# Patient Record
Sex: FEMALE | Race: BLACK OR AFRICAN AMERICAN
[De-identification: names, ages, dates, MRNs, and addresses within clinical notes are randomized per-mention and may not be internally consistent; named-entity substitution may affect disease eponyms.]

---

## 2017-09-18 ENCOUNTER — HOSPITAL ENCOUNTER (OUTPATIENT)
Dept: HOSPITAL 74 - FER | Age: 37
Setting detail: OBSERVATION
LOS: 3 days | Discharge: HOME | End: 2017-09-21
Attending: NURSE PRACTITIONER | Admitting: INTERNAL MEDICINE
Payer: COMMERCIAL

## 2017-09-18 VITALS — BODY MASS INDEX: 43.7 KG/M2

## 2017-09-18 DIAGNOSIS — Z88.2: ICD-10-CM

## 2017-09-18 DIAGNOSIS — Z88.8: ICD-10-CM

## 2017-09-18 DIAGNOSIS — K92.2: Primary | ICD-10-CM

## 2017-09-18 DIAGNOSIS — G40.909: ICD-10-CM

## 2017-09-18 DIAGNOSIS — Z98.891: ICD-10-CM

## 2017-09-18 DIAGNOSIS — I10: ICD-10-CM

## 2017-09-18 DIAGNOSIS — R11.2: ICD-10-CM

## 2017-09-18 DIAGNOSIS — E03.9: ICD-10-CM

## 2017-09-18 DIAGNOSIS — E89.0: ICD-10-CM

## 2017-09-18 DIAGNOSIS — F41.9: ICD-10-CM

## 2017-09-18 DIAGNOSIS — Z87.11: ICD-10-CM

## 2017-09-18 DIAGNOSIS — N12: ICD-10-CM

## 2017-09-18 DIAGNOSIS — Z87.442: ICD-10-CM

## 2017-09-18 LAB
ALBUMIN SERPL-MCNC: 4.1 G/DL (ref 3.5–5)
ALP SERPL-CCNC: 89 U/L (ref 32–92)
ALT SERPL-CCNC: 16 U/L (ref 10–40)
ANION GAP SERPL CALC-SCNC: 12 MMOL/L (ref 8–16)
APPEARANCE UR: (no result)
AST SERPL-CCNC: 22 U/L (ref 10–42)
BACTERIA #/AREA URNS HPF: (no result) /HPF
BASOPHILS # BLD: 2.2 % (ref 0–2)
BILIRUB SERPL-MCNC: 0.2 MG/DL (ref 0.2–1)
CALCIUM SERPL-MCNC: 8.8 MG/DL (ref 8.4–10.2)
CO2 SERPL-SCNC: 19 MMOL/L (ref 22–28)
COLOR UR: (no result)
CREAT SERPL-MCNC: 1 MG/DL (ref 0.6–1.3)
DEPRECATED RDW RBC AUTO: 14.1 % (ref 11.6–15.6)
EOSINOPHIL # BLD: 1.8 % (ref 0–4.5)
GLUCOSE SERPL-MCNC: 100 MG/DL (ref 74–106)
INR BLD: 1.01 (ref 0.82–1.09)
LEUKOCYTE ESTERASE UR QL STRIP: (no result)
MCH RBC QN AUTO: 26.2 PG (ref 25.7–33.7)
MCHC RBC AUTO-ENTMCNC: 32.6 G/DL (ref 32–36)
MCV RBC: 80.2 FL (ref 80–96)
NEUTROPHILS # BLD: 66.3 % (ref 42.8–82.8)
PH UR: 7 [PH] (ref 4.5–8)
PLATELET # BLD AUTO: 305 K/MM3 (ref 134–434)
PMV BLD: 9 FL (ref 7.5–11.1)
PROT SERPL-MCNC: 7.4 G/DL (ref 6.4–8.3)
PROT UR QL STRIP: (no result)
PT PNL PPP: 11.3 SEC (ref 10.2–13)
RBC # BLD AUTO: >100 /HPF (ref 0–3)
RBC # UR STRIP: (no result) /UL
SP GR UR: 1.02 (ref 1–1.02)
UROBILINOGEN UR STRIP-MCNC: 0.2 MG/DL (ref 0.2–1)
WBC # BLD AUTO: 11.6 K/MM3 (ref 4–10.8)

## 2017-09-18 PROCEDURE — G0378 HOSPITAL OBSERVATION PER HR: HCPCS

## 2017-09-18 NOTE — PDOC
History of Present Illness





- General


History Source: Patient


Exam Limitations: No Limitations





- History of Present Illness


Initial Comments: 


09/18/17 22:16


Patient is a 37 year old female with a significant past medical history of 

Abdominal surgeries, Seizures, Peptic Ulcers, Anxiety, kidney stones who 

presents to the ED with complaints of left flank pain beginning 6 days before 

ED arrival.  Patient reports left flank pain began suddenly 6 days ago that has 

increased in intensity since then. She reports abdominal pain beginning 5 days 

ago.  Patient reports hematuria and hematemesis secondary to left flank pain 

and abdominal pain.  She reports taking Naprosyn x2 with slight relief every 

day for 5 days (1000 mg per day).  Patient reports chronic episodes of nausea 

beginning 4 days ago secondary to flank pain.  She reports not being able to 

hold any medication down due to vomiting. 





Denies chest pain, SOB. Denies diarrhea, constipation. Denies dizziness, 

lightheadedness. Denies fever, chills. Denies any dysuria, hematochezia. Denies 

any other symptoms. 


Allergies: Torodal (seizures), Sulfa, Ketorolac tromethamine 


Social history 


Surgical history: Multiple abdominal hernia surgeries. Gallbladder removal. 

Appendix removal. Thyroid removal. Parathyroid removal. 


PMD: Dr. Aquilino Leo








09/18/17 22:20








09/18/17 22:21








<Julius Kessler - Last Filed: 09/18/17 22:23>





<Magnolia Boone - Last Filed: 09/21/17 06:08>





- General


Chief Complaint: Pain


Stated Complaint: ABD, LEFT FLANK PAIN


Time Seen by Provider: 09/18/17 21:12





Past History





<Julius Kessler - Last Filed: 09/18/17 22:23>





- Past Medical History


Cancer: Yes (H/O CAPILLARY CANCER)


HTN: Yes


Kidney Stones: Yes


Psychiatric Problems: Yes (ANXIETY)


Seizures: Yes


Thyroid Disease: Yes





- Surgical History


Abdominal Surgery: Yes (HERNIA REPAIRS)





- Suicide/Smoking/Psychosocial Hx


Smoking History: Never smoked


Substance Use Type: None





<Magnolia Boone - Last Filed: 09/21/17 06:08>





- Past Medical History


Allergies/Adverse Reactions: 


 Allergies











Allergy/AdvReac Type Severity Reaction Status Date / Time


 


ketorolac tromethamine Allergy   Verified 09/18/17 20:55





[From Toradol]     


 


Sulfa (Sulfonamide Allergy   Verified 09/18/17 20:58





Antibiotics)     


 


tramadol Allergy   Verified 09/18/17 20:55











Home Medications: 


Ambulatory Orders





Atenolol [Tenormin -] 50 mg PO BID 09/18/17 


Levetiracetam [Keppra -] 750 mg PO BID 09/18/17 


Levothyroxine Sodium [Synthroid] 275 mcg PO DAILY 09/18/17 


Liothyronine Sodium [Cytomel] 5 mcg PO DAILY 09/18/17 


Prazosin HCl [Minipress] 5 mg PO HS 09/18/17 


Quetiapine Fumarate [Seroquel] 300 mg PO HS 09/18/17 


Tizanidine HCl [Zanaflex] 4 mg PO ASDIR 09/18/17 


Zolpidem Tartrate [Ambien] 10 mg PO ASDIR PRN 09/18/17 











**Review of Systems





- Review of Systems


Able to Perform ROS?: Yes


Comments:: 


09/18/17 22:16


GENERAL/CONSTITUTIONAL: No fever or chills. No weakness.


HEAD, EYES, EARS, NOSE AND THROAT: No change in vision. No ear pain or 

discharge. No sore throat.


GASTROINTESTINAL: No nausea, vomiting, diarrhea or constipation.


GENITOURINARY: No dysuria, frequency, or change in urination.


CARDIOVASCULAR: No chest pain or shortness of breath.


RESPIRATORY: No cough, wheezing, or hemoptysis.


MUSCULOSKELETAL: +Left flank pain. + Epigastric pain. 


No joint or muscle swelling or pain. No neck or back pain.


SKIN: No rash


NEUROLOGIC: No headache, vertigo, loss of consciousness, or change in strength/

sensation.


ENDOCRINE: No increased thirst. No abnormal weight change.


HEMATOLOGIC/LYMPHATIC: No anemia, easy bleeding, or history of blood clots.


ALLERGIC/IMMUNOLOGIC: No hives or skin allergy.








09/18/17 22:23





All Other Systems: Reviewed and Negative





<Julius Kessler - Last Filed: 09/18/17 22:23>





*Physical Exam





- Vital Signs


 Last Vital Signs











Temp Pulse Resp BP Pulse Ox


 


 98.7 F   113 H  20   147/106   98 


 


 09/18/17 20:55  09/18/17 20:55  09/18/17 20:55  09/18/17 20:55  09/18/17 20:55














- Physical Exam


Comments: 


09/18/17 22:17


GENERAL: Awake, alert, and fully oriented, Moderate distress from flank pain 

and epigastric pain


HEAD: No signs of trauma


EYES: PERRLA, EOMI, sclera anicteric, conjunctiva clear


ENT: Auricles normal inspection, hearing grossly normal, nares patent, 

oropharynx clear without exudates. Moist mucosa


NECK: Normal ROM, supple, no lymphadenopathy, JVD, or masses


LUNGS: Breath sounds equal, clear to auscultation bilaterally.  No wheezes, and 

no crackles


HEART: Regular rate and rhythm, normal S1 and S2, no murmurs, rubs or gallops


ABDOMEN: +CVA tenderness. +Epigastric tenderness. +Left upper quadrant left 

flank tenderness. + Multiple old abdominal wall healed incision. NO peritoneal 

signs or masses. 


Soft, normoactive bowel sounds.  No guarding, no rebound.


EXTREMITIES: Normal range of motion, no edema.  No clubbing or cyanosis. No 

cords, erythema, or tenderness


NEUROLOGICAL: Cranial nerves II through XII grossly intact.  Normal speech, 

normal gait


SKIN: Warm, Dry, normal turgor, no rashes or lesions noted.








09/18/17 22:23








<Julius Kessler - Last Filed: 09/18/17 22:23>





- Vital Signs


 Last Vital Signs











Temp Pulse Resp BP Pulse Ox


 


 98.7 F   113 H  20   147/106   98 


 


 09/18/17 20:55  09/18/17 20:55  09/18/17 20:55  09/18/17 20:55  09/18/17 20:55














- Physical Exam


Comments: 





12-lead electrocardiogram showed normal sinus rhythm 87 bpm; axis, wave forms 

and intervals were all normal.  There is no acute ST or T-wave abnormalities.








<Magnolia Boone - Last Filed: 09/21/17 06:08>





ED Treatment Course





- LABORATORY


CBC & Chemistry Diagram: 


 09/18/17 21:25





 09/18/17 21:25





- ADDITIONAL ORDERS


Additional order review: 


 Laboratory  Results











  09/18/17 09/18/17 09/18/17





  21:25 21:25 21:10


 


PT with INR  11.3  


 


INR  1.01  


 


Sodium   138 


 


Potassium   3.9 


 


Chloride   107 


 


Carbon Dioxide   19 L 


 


Anion Gap   12 


 


BUN   8 


 


Creatinine   1.0 


 


Creat Clearance w eGFR   > 60 


 


Random Glucose   100 


 


Calcium   8.8 


 


Total Bilirubin   0.2 


 


AST   22 


 


ALT   16 


 


Alkaline Phosphatase   89 


 


Total Protein   7.4 


 


Albumin   4.1 


 


Urine Color    Pink


 


Urine Appearance    Sl cloudy


 


Urine pH    7.0


 


Ur Specific Gravity    1.020


 


Urine Protein    1+ H


 


Urine Glucose (UA)    Negative


 


Urine Ketones    Negative


 


Urine Blood    3+ H


 


Urine Nitrite    Negative


 


Urine Bilirubin    Negative


 


Urine Urobilinogen    0.2


 


Urine RBC    >100


 


Urine WBC    10-20


 


Ur Epithelial Cells    3+


 


Urine Bacteria    1+


 


Urine HCG, Qual    Negative








 











  09/18/17





  21:25


 


RBC  5.05


 


MCV  80.2


 


MCHC  32.6


 


RDW  14.1


 


MPV  9.0


 


Neutrophils %  66.3


 


Lymphocytes %  24.4


 


Monocytes %  5.3


 


Eosinophils %  1.8


 


Basophils %  2.2 H














- Medications


Given in the ED: 


ED Medications














Discontinued Medications














Generic Name Dose Route Start Last Admin





  Trade Name Carlos Enrique  PRN Reason Stop Dose Admin


 


Morphine Sulfate  4 mg 09/18/17 22:04 09/18/17 22:14





  Morphine Injection -  IVPUSH 09/18/17 22:05  4 mg





  ONCE ONE   Administration


 


Ondansetron HCl  4 mg 09/18/17 22:08 09/18/17 22:14





  Zofran Injection  IVPUSH 09/18/17 22:09  4 mg





  ONCE ONE   Administration














<Julius Kessler - Last Filed: 09/18/17 22:23>





- LABORATORY


CBC & Chemistry Diagram: 


 09/20/17 08:25





 09/20/17 08:25





- ADDITIONAL ORDERS


Additional order review: 


 Laboratory  Results











  09/18/17





  21:10


 


Urine Color  Pink


 


Urine Appearance  Sl cloudy


 


Urine pH  7.0


 


Ur Specific Gravity  1.020


 


Urine Protein  1+ H


 


Urine Glucose (UA)  Negative


 


Urine Ketones  Negative


 


Urine Blood  3+ H


 


Urine Nitrite  Negative


 


Urine Bilirubin  Negative


 


Urine Urobilinogen  0.2














<Magnolia Boone - Last Filed: 09/21/17 06:08>





Progress Note





- Progress Note


Progress Note: 


Documentation has been prepared under my direction and personally reviewed by 

me in its entirety. I attest that this documented accurately reflects all work, 

treatment, procedures and medical decision making performed by me.





<Magnolia Boone - Last Filed: 09/21/17 06:08>





Medical Decision Making





- Medical Decision Making


As noted above, this 37-year-old woman with a history of kidney stones (

requiring stent placement), peptic ulcer disease, seizures presents with 

several day history of left flank/CVA pain for which she took 1 g of naproxen 

daily.  Within of a few days of this, she developed epigastric pain and nausea.

  She has been vomiting today with blood in her vomitus.  No history of fever/

chills.  She has had no melena or blood per rectum.  Patient states that she 

knew that she was not supposed to take nonsteroidal anti-inflammatories in 

light of her peptic ulcer disease, but flank pain was severe.  She has not had 

dysuria/urinary frequency or urgency but has noted blood in her urine for the 

last few days.


Exam as noted.


Patient had been given 4 mg of Zofran/4 mg of morphine IV for her pain and 

nausea/vomiting.  Protonix 40 mg IV was given for her upper GI bleeding.


Laboratory evaluation notable for a white blood cell count of 11,600.  Of note, 

hemoglobin and hematocrit are normal at 13.2/40.5.  BUN and creatinine are 

normal at 8 and 1.  Remainder of the chemistry profile reveals no abnormalities 

of electrolytes or liver function tests.  Urinalysis shows greater than 100 RBCs

/1020 WBCs, 3+ epi, 1+ bacteria on microscopic exam.





Renal stone protocol CT showed no evidence of kidney stones/hydronephrosis or 

other abnormalities of the urinary tract.  Moreover, there is no other acute 

pathology such as acute diverticulitis or bowel obstruction.





Patient given another dose of Zofran 4 mg IV and Rocephin 1 g IV for presumed 

urinary tract infection.





Patient continued to vomit and have severe pain.  There was bright red blood in 

the vomitus.  Because of patient's intractable nausea/vomiting and persistent 

pain, she will need admission for evaluation/treatment of her upper GI bleed 

and pain control.














<Magnolia Boone - Last Filed: 09/21/17 06:08>





*DC/Admit/Observation/Transfer





- Attestations


Scribe Attestion: 





09/18/17 22:17





Documentation prepared by Julius Kessler, acting as medical scribe for Magnolia Boone MD/DO.





<Julius Kessler - Last Filed: 09/18/17 22:23>





- Discharge Dispostion


Admit: Yes





<Magnolia Boone - Last Filed: 09/21/17 06:08>


Diagnosis at time of Disposition: 


Intractable vomiting with nausea


Qualifiers:


 Vomiting type: unspecified Qualified Code(s): R11.2 - Nausea with vomiting, 

unspecified





- Discharge Dispostion


Condition at time of disposition: Stable

## 2017-09-19 LAB
ANION GAP SERPL CALC-SCNC: 8 MMOL/L (ref 8–16)
APTT BLD: 27.6 SECONDS (ref 24–38.9)
CALCIUM SERPL-MCNC: 8.4 MG/DL (ref 8.4–10.2)
CO2 SERPL-SCNC: 22 MMOL/L (ref 22–28)
CREAT SERPL-MCNC: 0.8 MG/DL (ref 0.6–1.3)
DEPRECATED RDW RBC AUTO: 14.1 % (ref 11.6–15.6)
GLUCOSE SERPL-MCNC: 91 MG/DL (ref 74–106)
INR BLD: 1.07 (ref 0.82–1.09)
MAGNESIUM SERPL-MCNC: 2 MG/DL (ref 1.8–2.4)
MCH RBC QN AUTO: 26 PG (ref 25.7–33.7)
MCHC RBC AUTO-ENTMCNC: 31.9 G/DL (ref 32–36)
MCV RBC: 81.5 FL (ref 80–96)
PLATELET # BLD AUTO: 284 K/MM3 (ref 134–434)
PMV BLD: 8.9 FL (ref 7.5–11.1)
PT PNL PPP: 12 SEC (ref 10.2–13)
URINE MARIJUANA THC: POSITIVE NG/ML
WBC # BLD AUTO: 12.7 K/MM3 (ref 4–10.8)

## 2017-09-19 PROCEDURE — 3E03329 INTRODUCTION OF OTHER ANTI-INFECTIVE INTO PERIPHERAL VEIN, PERCUTANEOUS APPROACH: ICD-10-PCS | Performed by: NURSE PRACTITIONER

## 2017-09-19 PROCEDURE — 3E0337Z INTRODUCTION OF ELECTROLYTIC AND WATER BALANCE SUBSTANCE INTO PERIPHERAL VEIN, PERCUTANEOUS APPROACH: ICD-10-PCS | Performed by: NURSE PRACTITIONER

## 2017-09-19 PROCEDURE — 3E0333Z INTRODUCTION OF ANTI-INFLAMMATORY INTO PERIPHERAL VEIN, PERCUTANEOUS APPROACH: ICD-10-PCS | Performed by: NURSE PRACTITIONER

## 2017-09-19 PROCEDURE — 3E033NZ INTRODUCTION OF ANALGESICS, HYPNOTICS, SEDATIVES INTO PERIPHERAL VEIN, PERCUTANEOUS APPROACH: ICD-10-PCS | Performed by: INTERNAL MEDICINE

## 2017-09-19 PROCEDURE — 3E033GC INTRODUCTION OF OTHER THERAPEUTIC SUBSTANCE INTO PERIPHERAL VEIN, PERCUTANEOUS APPROACH: ICD-10-PCS | Performed by: NURSE PRACTITIONER

## 2017-09-19 RX ADMIN — HYDROMORPHONE HYDROCHLORIDE PRN MG: 1 INJECTION, SOLUTION INTRAMUSCULAR; INTRAVENOUS; SUBCUTANEOUS at 15:28

## 2017-09-19 RX ADMIN — PRAZOSIN HYDROCHLORIDE SCH MG: 5 CAPSULE ORAL at 21:17

## 2017-09-19 RX ADMIN — QUETIAPINE FUMARATE SCH MG: 100 TABLET ORAL at 21:17

## 2017-09-19 RX ADMIN — ATENOLOL SCH MG: 50 TABLET ORAL at 21:17

## 2017-09-19 RX ADMIN — CEFTRIAXONE SODIUM SCH MLS/HR: 2 INJECTION, POWDER, FOR SOLUTION INTRAMUSCULAR; INTRAVENOUS at 09:28

## 2017-09-19 RX ADMIN — ONDANSETRON PRN MG: 2 INJECTION INTRAMUSCULAR; INTRAVENOUS at 09:25

## 2017-09-19 RX ADMIN — ATENOLOL SCH MG: 50 TABLET ORAL at 09:27

## 2017-09-19 RX ADMIN — POTASSIUM CHLORIDE AND SODIUM CHLORIDE SCH MLS/HR: 900; 150 INJECTION, SOLUTION INTRAVENOUS at 10:15

## 2017-09-19 RX ADMIN — LEVETIRACETAM SCH MG: 250 TABLET, FILM COATED ORAL at 21:16

## 2017-09-19 RX ADMIN — LIOTHYRONINE SODIUM SCH MCG: 5 TABLET ORAL at 10:03

## 2017-09-19 RX ADMIN — PANTOPRAZOLE SODIUM SCH MLS/HR: 40 INJECTION, POWDER, FOR SOLUTION INTRAVENOUS at 08:30

## 2017-09-19 RX ADMIN — HYDROMORPHONE HYDROCHLORIDE PRN MG: 1 INJECTION, SOLUTION INTRAMUSCULAR; INTRAVENOUS; SUBCUTANEOUS at 22:21

## 2017-09-19 NOTE — CONS
GASTROENTEROLOGY CONSULTATION

 

DATE OF CONSULTATION:  09/19/2017

 

HISTORY OF PRESENT ILLNESS:  The patient is a 37-year-old female with a past medical

history of hypertension, hypothyroidism status post thyroid cancer with surgery,

peptic ulcer disease, and renal calculi.  She admits to having left flank pain over

the past couple of days and noticed some blood in her urine with worsening pain,

prompting her to come to the emergency room.  She also had an episode of hematemesis.

 She reports taking Naprosyn twice daily for her pain for the past 5 days.  She has

not had a recent endoscopy.  She has not had further episodes of hematemesis while

she has been hospitalized.  She denies any melena.  She does admit to epigastric

abdominal pain.  No hematochezia.  No weight loss.

 

PAST MEDICAL HISTORY:  Hypertension, anxiety, hypothyroidism, thyroid cancer, peptic

ulcer disease, seizure disorder, and kidney stones.

 

SURGICAL HISTORY:  Thyroidectomy, parathyroidectomy, cholecystectomy, appendectomy,

multiple umbilical hernia repairs.

 

SOCIAL HISTORY:  Recently graduated from [employer].  Denies smoking and drug

use.  Drinks socially.

 

FAMILY HISTORY:  No history of GI or gynecological malignancies.

 

HOME MEDICATIONS:  Reviewed.

 

REVIEW OF SYSTEMS:  Negative except for the pertinent positives listed in the HPI.

 

LABORATORY DATA:  White blood cell count 12.7, hemoglobin 12.5 and hematocrit 39, MCV

81, platelet count 284.  INR 1.  Sodium 136, potassium 3.6, chloride 106, BUN 8,

creatinine 0.8, calcium 8.4, bilirubin 0.2, AST 22, ALT 16, alkaline phosphatase 89. 

Total amylase 42, lipase less than 20.  Urine with protein and blood.

 

CT scan of the abdomen and pelvis was performed on this admission and reveals no

evidence of urolithiasis or obstructive uropathy.  No definitive acute pathology

identified.  Status post cholecystectomy.

 

Urine culture is pending.

 

IMPRESSION:  Epigastric abdominal pain, episode of hematemesis while on non-steroidal

anti-inflammatory drug therapy.  Differential diagnosis includes peptic ulcer

disease, angioectasias, esophagitis.  There is no sign of an overt gastrointestinal

bleed at this time.

 

RECOMMENDATION:  N.p.o., IV fluids, Protonix infusion.  Avoid NSAID.  We will plan

for diagnostic upper endoscopy today.  Risks including, but not limited to, bleeding,

infection, anesthesia, risks of a tear in the bowel were discussed with the patient

in detail.

 

 

DO JOVITA MARIEE/[id number]

DD: 09/19/2017 13:11

DT: 09/19/2017 16:13

Job #:  [id number]

## 2017-09-19 NOTE — HP
CHIEF COMPLAINT:  abdominal pain 





PCP: Dr Leo





HISTORY OF PRESENT ILLNESS:  Patient is a 36 y/o female with a past medical 

history of hypertension, hypothyroidism (thyroid CA), peptic ulcer disease, and 

kidney stones.  Patient reports left flank pain for the past 6 days.  She 

reports noticing blood in her urine yesterday evening and worsening flank pain.

  Patient also reports worsening of flank pain within the past 24 hours with 

hematuria and hemataemesis.  She reports taking naproxyn BID for her flank pain 

daily for the past 5 days.  Patient denies any fever.  





ER course was notable for:


(1) ct scan of abd/pelvis (renal stone) no obstructive uropathy, no 

nephrolithasis, no acute pathology


(2) urinalysis + 3 blood + 1 bacteria 


(3) h/h 12/39





Recent Travel: none





PAST MEDICAL HISTORY:  hypertension, anxiety, hypothyroidism, thyroid CA, 

peptic ulcer disease, seizure disorderand kidney stones





PAST SURGICAL HISTORY:  thyroidectomy, parathyroidectomy, cholecystecomy, 

appendectomy, multiple umbilical hernia repairs





Social History:  recently graduated from [employer] school 


Smoking: none


Alcohol:social 


Drugs: none





Family History:  non contributory to this admission 


Allergies





ketorolac tromethamine [From Toradol] Allergy (Verified 09/18/17 20:55)


 


Sulfa (Sulfonamide Antibiotics) Allergy (Verified 09/18/17 20:58)


 


tramadol Allergy (Verified 09/18/17 20:55)


 








HOME MEDICATIONS:


 Home Medications











 Medication  Instructions  Recorded


 


Atenolol [Tenormin -] 50 mg PO BID 09/18/17


 


Levetiracetam [Keppra -] 750 mg PO BID 09/18/17


 


Levothyroxine Sodium [Synthroid] 275 mcg PO DAILY 09/18/17


 


Liothyronine Sodium [Cytomel] 5 mcg PO DAILY 09/18/17


 


Lorazepam [Ativan] 2 mg PO DAILY 09/18/17


 


Prazosin HCl [Minipress] 4 mg PO ASDIR 09/18/17


 


Quetiapine Fumarate [Seroquel] 300 mg PO HS 09/18/17


 


Tizanidine HCl [Zanaflex] 4 mg PO ASDIR 09/18/17


 


Zolpidem Tartrate [Ambien] 10 mg PO ASDIR PRN 09/18/17








REVIEW OF SYSTEMS


CONSTITUTIONAL: 


Absent:  fever, chills, diaphoresis, generalized weakness, malaise, loss of 

appetite, weight change


HEENT: 


Absent:  rhinorrhea, nasal congestion, throat pain, throat swelling, difficulty 

swallowing, mouth swelling, ear pain, eye pain, visual changes


CARDIOVASCULAR: 


Absent: chest pain, syncope, palpitations, irregular heart rate, lightheadedness

, peripheral edema


RESPIRATORY: 


Absent: cough, shortness of breath, dyspnea with exertion, orthopnea, wheezing, 

stridor, hemoptysis


GASTROINTESTINAL:


Present: nausea, vomiting, hematoemesis 


Absent: abdominal pain, abdominal distension, , diarrhea, constipation, melena, 

hematochezia


GENITOURINARY: 


present: flank pain, hematuria 


Absent: dysuria, frequency, urgency, hesitancy, hematuria, genital pain


MUSCULOSKELETAL: 


Absent: myalgia, arthralgia, joint swelling, back pain, neck pain


SKIN: 


Absent: rash, itching, pallor


HEMATOLOGIC/IMMUNOLOGIC: 


Absent: easy bleeding, easy bruising, lymphadenopathy, frequent infections


ENDOCRINE:


Absent: unexplained weight gain, unexplained weight loss, heat intolerance, 

cold intolerance


NEUROLOGIC: 


Absent: headache, focal weakness or paresthesias, dizziness, unsteady gait, 

seizure, mental status changes, bladder or bowel incontinence


PSYCHIATRIC: 


Absent: anxiety, depression, suicidal or homicidal ideation, hallucinations.








PHYSICAL EXAMINATION


 Vital Signs - 24 hr











  09/19/17 09/19/17





  02:29 03:03


 


Temperature 98.9 F 98.9 F


 


Pulse Rate 93 H 93 H


 


Respiratory 19 18





Rate  


 


Blood Pressure 148/94 148/94


 


O2 Sat by Pulse  98





Oximetry (%)  











GENERAL: Awake, alert, and fully oriented, in no acute distress.


HEAD: Normal with no signs of trauma.


EYES: Pupils equal, round and reactive to light, extraocular movements intact, 

sclera anicteric, conjunctiva clear. No lid lag.


EARS, NOSE, THROAT: Ears normal, nares patent, oropharynx clear without 

exudates. Moist mucous membranes.


NECK: Normal range of motion, supple without lymphadenopathy, JVD, or masses.


LUNGS: Breath sounds equal, clear to auscultation bilaterally. No wheezes, and 

no crackles. No accessory muscle use.


HEART: Regular rate and rhythm, normal S1 and S2 without murmur, rub or gallop.


ABDOMEN: Soft,obese, well healed surgical scars, + suprapubic tenderness, + 

epigastric tenderness, normoactive bowel sounds, no guarding, no rebound, no 

masses.  No hepatomegaly or  splenomegaly. 


MUSCULOSKELETAL: Normal range of motion at all joints. No bony deformities or 

tenderness. + right CVA tenderness.


UPPER EXTREMITIES: 2+ pulses, warm, well-perfused. No cyanosis. No clubbing. No 

peripheral edema.


LOWER EXTREMITIES: 2+ pulses, warm, well-perfused. No calf tenderness. No 

peripheral edema. 


NEUROLOGICAL:  Cranial nerves II-XII intact. Normal speech. Normal gait.


PSYCHIATRIC: Cooperative. Good eye contact. Appropriate mood and affect.


SKIN: Warm, dry, normal turgor, no rashes or lesions noted, normal capillary 

refill. 





ASSESSMENT/PLAN:








F/E/N


- npo -->ivf


- replete lytes prn





ppx


- hold ac due to active bleed


- protonix gtt


- scd


- oob





dispo: requires obsv admission 





Problem List





- Problem


(1) Upper GI bleeding


Assessment/Plan: 


- likely secondary to PUD, pt reports taking naproxyn daily, start protonix gtt


- npo for emergent EGD


- case discussed with Dr Madden, GI will evaluate patient today


- avoid nsaids


Code(s): K92.2 - GASTROINTESTINAL HEMORRHAGE, UNSPECIFIED





(2) Hypertension


Assessment/Plan: 


- continue atenolol and minipress,  b/p at goal


- strict b/p monitoring 





Code(s): I10 - ESSENTIAL (PRIMARY) HYPERTENSION   





(3) Hypothyroidism


Assessment/Plan: 


- s/p thryoidectomy and parathyoidectomy, continue synthroid (iv dose) and 

cytomel


- pending tsh 





Code(s): E03.9 - HYPOTHYROIDISM, UNSPECIFIED   





(4) Pyelonephritis


Assessment/Plan: 


-ct of abd/pelvis reviewed, no hydronephrois, no obstructive uropathy noted


- rocephin 2gm IV daily, pending urine culture 


- monitor wbc and fever curve 


Code(s): N12 - TUBULO-INTERSTITIAL NEPHRITIS, NOT SPCF AS ACUTE OR CHRONIC





(5) Anxiety


Assessment/Plan: 


- continue clonazepam 0.5mg daily prn dosage verified with [facility] pharmacy (

[place]) and  reference # [id number] and seroquel (home dose) 


Code(s): F41.9 - ANXIETY DISORDER, UNSPECIFIED








Visit type





- Emergency Visit


Emergency Visit: Yes


ED Registration Date: 09/19/17


Care time: The patient presented to the Emergency Department on the above date 

and was hospitalized for further evaluation of their emergent condition.





- New Patient


This patient is new to me today: Yes


Date on this admission: 09/19/17





- Critical Care


Critical Care patient: No

## 2017-09-19 NOTE — EKG
Test Reason : 

Blood Pressure : ***/*** mmHG

Vent. Rate : 086 BPM     Atrial Rate : 086 BPM

   P-R Int : 152 ms          QRS Dur : 086 ms

    QT Int : 372 ms       P-R-T Axes : 034 024 006 degrees

   QTc Int : 445 ms

 

NORMAL SINUS RHYTHM

NONSPECIFIC T WAVE ABNORMALITY

ABNORMAL ECG

NO PREVIOUS ECGS AVAILABLE

NO CLINICAL INFORMATION IS AVAILABLE

REPEAT EKG IF CLINICALLY INDICATED

Confirmed by KEYLA RAMOS MD ([id number]) on 9/19/2017 10:07:44 PM

 

Referred By: MD ENGEL           Confirmed By:KEYLA RAMOS MD

## 2017-09-20 LAB
ANION GAP SERPL CALC-SCNC: 1 MMOL/L (ref 8–16)
BASOPHILS # BLD: 3.5 % (ref 0–2)
CALCIUM SERPL-MCNC: 8.1 MG/DL (ref 8.4–10.2)
CO2 SERPL-SCNC: 21 MMOL/L (ref 22–28)
CREAT SERPL-MCNC: 0.9 MG/DL (ref 0.6–1.3)
DEPRECATED RDW RBC AUTO: 14 % (ref 11.6–15.6)
EOSINOPHIL # BLD: 6.7 % (ref 0–4.5)
GLUCOSE SERPL-MCNC: 84 MG/DL (ref 74–106)
MAGNESIUM SERPL-MCNC: 2 MG/DL (ref 1.8–2.4)
MCH RBC QN AUTO: 26.5 PG (ref 25.7–33.7)
MCHC RBC AUTO-ENTMCNC: 33 G/DL (ref 32–36)
MCV RBC: 80.3 FL (ref 80–96)
NEUTROPHILS # BLD: 49.3 % (ref 42.8–82.8)
PHOSPHATE SERPL-MCNC: 2.9 MG/DL (ref 2.5–4.6)
PLATELET # BLD AUTO: 269 K/MM3 (ref 134–434)
PMV BLD: 8.5 FL (ref 7.5–11.1)
T4 SERPL-MCNC: 6.9 UG/DL (ref 4.8–13.9)
TSH SERPL-ACNC: 23.6 UIU/ML (ref 0.36–3.74)
WBC # BLD AUTO: 7.9 K/MM3 (ref 4–10.8)

## 2017-09-20 RX ADMIN — POTASSIUM CHLORIDE AND SODIUM CHLORIDE SCH MLS/HR: 900; 150 INJECTION, SOLUTION INTRAVENOUS at 10:21

## 2017-09-20 RX ADMIN — CEFTRIAXONE SODIUM SCH MLS/HR: 2 INJECTION, POWDER, FOR SOLUTION INTRAMUSCULAR; INTRAVENOUS at 10:22

## 2017-09-20 RX ADMIN — PANTOPRAZOLE SODIUM SCH MLS/HR: 40 INJECTION, POWDER, FOR SOLUTION INTRAVENOUS at 14:30

## 2017-09-20 RX ADMIN — HYDROMORPHONE HYDROCHLORIDE PRN MG: 1 INJECTION, SOLUTION INTRAMUSCULAR; INTRAVENOUS; SUBCUTANEOUS at 05:55

## 2017-09-20 RX ADMIN — MORPHINE SULFATE PRN MG: 4 INJECTION, SOLUTION INTRAMUSCULAR; INTRAVENOUS at 11:41

## 2017-09-20 RX ADMIN — LEVOTHYROXINE SODIUM SCH MCG: 125 TABLET ORAL at 06:11

## 2017-09-20 RX ADMIN — ONDANSETRON PRN MG: 2 INJECTION INTRAMUSCULAR; INTRAVENOUS at 07:36

## 2017-09-20 RX ADMIN — ATENOLOL SCH MG: 50 TABLET ORAL at 10:21

## 2017-09-20 RX ADMIN — MORPHINE SULFATE PRN MG: 4 INJECTION, SOLUTION INTRAMUSCULAR; INTRAVENOUS at 16:16

## 2017-09-20 RX ADMIN — PANTOPRAZOLE SODIUM SCH MLS/HR: 40 INJECTION, POWDER, FOR SOLUTION INTRAVENOUS at 04:29

## 2017-09-20 RX ADMIN — LIOTHYRONINE SODIUM SCH MCG: 5 TABLET ORAL at 10:20

## 2017-09-20 RX ADMIN — QUETIAPINE FUMARATE SCH MG: 100 TABLET ORAL at 21:48

## 2017-09-20 RX ADMIN — PRAZOSIN HYDROCHLORIDE SCH MG: 5 CAPSULE ORAL at 21:49

## 2017-09-20 RX ADMIN — ATENOLOL SCH MG: 50 TABLET ORAL at 21:49

## 2017-09-20 RX ADMIN — LEVETIRACETAM SCH MG: 250 TABLET, FILM COATED ORAL at 21:48

## 2017-09-20 RX ADMIN — LEVETIRACETAM SCH MG: 250 TABLET, FILM COATED ORAL at 10:20

## 2017-09-20 RX ADMIN — MORPHINE SULFATE PRN MG: 4 INJECTION, SOLUTION INTRAMUSCULAR; INTRAVENOUS at 22:21

## 2017-09-20 NOTE — PN
Physical Exam: 


SUBJECTIVE: Patient seen and examined, reports flank pain is much improved, 

does report ongoing nausea and one episode of vomiting this AM 





OBJECTIVE:Patient is a 38 y/o female with a past medical history of hypertension

, hypothyroidism (thyroid CA), peptic ulcer disease, and kidney stones. Patient 

was admitted from the emergency department for a upper GI Bleed and 

pyleonephritis. 





 Vital Signs











 Period  Temp  Pulse  Resp  BP Sys/Valle  Pulse Ox


 


 Last 24 Hr  97.6 F-98.7 F  68-73  16-20  100-119/66-73  97-98











GENERAL: obese, The patient is awake, alert, and fully oriented, in no acute 

distress.


HEAD: Normal with no signs of trauma.


EYES: PERRL, extraocular movements intact, sclera anicteric, conjunctiva clear. 

No ptosis. 


ENT: Ears normal, nares patent, oropharynx clear without exudates, moist mucous 


membranes.


NECK: Trachea midline, full range of motion, supple. 


LUNGS: Breath sounds equal, clear to auscultation bilaterally, no wheezes, no 

crackles, no 


accessory muscle use. 


HEART: Regular rate and rhythm, S1, S2 without murmur, rub or gallop.


ABDOMEN: Soft, epigastric tenderness, well healed surgical scars,  nondistended

, normoactive bowel sounds, no guarding, no 


rebound, no hepatosplenomegaly, no masses, right cva tenderness. 


EXTREMITIES: 2+ pulses, warm, well-perfused, no edema. 


NEUROLOGICAL: Cranial nerves II through XII grossly intact. Normal speech, gait 

not 


observed.


PSYCH: Normal mood, normal affect.


SKIN: Warm, dry, normal turgor, no rashes or lesions noted














 Laboratory Results - last 24 hr











  09/19/17 09/19/17 09/20/17





  07:50 16:00 08:25


 


WBC    7.9  D


 


RBC    4.59


 


Hgb    12.1


 


Hct    36.8


 


MCV    80.3


 


MCH    26.5


 


MCHC    33.0


 


RDW    14.0


 


Plt Count    269


 


MPV    8.5


 


Neutrophils %    49.3  D


 


Lymphocytes %    34.7  D


 


Monocytes %    5.8


 


Eosinophils %    6.7 H D


 


Basophils %    3.5 H


 


Sodium   


 


Potassium   


 


Chloride   


 


Carbon Dioxide   


 


Anion Gap   


 


BUN   


 


Creatinine   


 


Random Glucose   


 


Calcium   


 


Phosphorus  3.1  


 


Magnesium   


 


Opiates Screen   Positive 


 


Methadone Screen   Negative 


 


Barbiturate Screen   Negative 


 


Phencyclidine Screen   Negative 


 


Ur Amphetamines Screen   Negative 


 


MDMA (Ecstasy) Screen   Negative 


 


Benzodiazepines Screen   Negative 


 


Cocaine Screen   Negative 


 


U Marijuana (THC) Screen   Positive 














  09/20/17





  08:25


 


WBC 


 


RBC 


 


Hgb 


 


Hct 


 


MCV 


 


MCH 


 


MCHC 


 


RDW 


 


Plt Count 


 


MPV 


 


Neutrophils % 


 


Lymphocytes % 


 


Monocytes % 


 


Eosinophils % 


 


Basophils % 


 


Sodium  135 L


 


Potassium  4.0


 


Chloride  113 H


 


Carbon Dioxide  21 L


 


Anion Gap  1 L


 


BUN  7


 


Creatinine  0.9


 


Random Glucose  84


 


Calcium  8.1 L


 


Phosphorus  2.9


 


Magnesium  2.0


 


Opiates Screen 


 


Methadone Screen 


 


Barbiturate Screen 


 


Phencyclidine Screen 


 


Ur Amphetamines Screen 


 


MDMA (Ecstasy) Screen 


 


Benzodiazepines Screen 


 


Cocaine Screen 


 


U Marijuana (THC) Screen 








Active Medications











Generic Name Dose Route Start Last Admin





  Trade Name Freq  PRN Reason Stop Dose Admin


 


Atenolol  50 mg 09/19/17 10:00 09/20/17 10:21





  Tenormin -  PO   50 mg





  BID LISSET   Administration


 


Clonazepam  0.5 mg 09/19/17 10:11  





  Klonopin -  PO   





  BID PRN   





  ANXIETY   


 


Pantoprazole Sodium 80 mg/  100 mls @ 10 mls/hr 09/19/17 08:30 09/20/17 04:29





  Sodium Chloride  IVPB   10 mls/hr





  Q10H LISSET   Administration





  8 MG/HR   


 


Ceftriaxone Sodium  100 mls @ 200 mls/hr 09/19/17 10:00 09/20/17 10:22





  Rocephin 2gm Ivpb (Pre-Docked)  IVPB   200 mls/hr





  DAILY LISSET   Administration


 


Potassium Chloride/Sodium Chloride  1,000 mls @ 100 mls/hr 09/19/17 10:00 09/20/ 17 10:21





  Ns+20 Meq Kcl -  IV   100 mls/hr





  ASDIR LISSET   Administration


 


Levetiracetam 250 mg/  750 mg 09/19/17 22:00 09/20/17 10:20





  Levetiracetam 500 mg  PO   750 mg





  BID LISSET   Administration


 


Levothyroxine Sodium 125 mcg/  275 mcg 09/20/17 07:00 09/20/17 06:11





  Levothyroxine Sodium 150 mcg  PO   275 mcg





  DAILY@0700 LISSET   Administration


 


Liothyronine Sodium  5 mcg 09/19/17 10:00 09/20/17 10:20





  Cytomel -  PO   5 mcg





  DAILY LISSET   Administration


 


Morphine Sulfate  4 mg 09/20/17 10:31 09/20/17 11:41





  Morphine Injection -  IVPB   4 mg





  Q6H PRN   Administration





  PAIN   


 


Ondansetron HCl  4 mg 09/19/17 08:38 09/20/17 07:36





  Zofran Injection  IVPB   4 mg





  Q8H PRN   Administration





  NAUSEA   


 


Prazosin HCl  5 mg 09/19/17 22:00 09/19/17 21:17





  Minipress -  PO   5 mg





  HS LISSET   Administration


 


Quetiapine Fumarate  300 mg 09/19/17 22:00 09/19/17 21:17





  Seroquel -  PO   300 mg





  HS LISSET   Administration


 


Zolpidem Tartrate  10 mg 09/19/17 14:13  





  Ambien -  PO   





  HS PRN   





  INSOMNIA   








 Microbiology





09/18/17 21:10   Urine - Urine Clean Catch   Urine Culture - Final


                            Contaminated: Please Repeat








ASSESSMENT/PLAN:





1) GI


upper gi bleed


- egd completed yesterday, 9/19, small megan tear and gastritis noted


- pt is still unable to tolerate liquids continue protonix gtt, reglan 10mg 

IVPB x 1 ordered, continue zofran


- reports worsening of nausea with dilaudid, will change to morphine 


- GI consulted and following (Maryanne)





2) card 


hypertension


-b/p at goal, continue atenolol and minipress 





3) endo


hypothyroidism


- continue synthroid and cytomel 


- pending tsh and t4





4) psych


anxiety 


- continue clonazepam 0.5mg daily prn dosage verified with [facility] (

[place]) and  reference # [id number] and seroquel (home dose) c





F/E/N


- full liquid diet 


- replete lytes prn





ppx


- protonix gtt


- scd


- oob





dispo: requires obsv admission 

















Problem List





- Problems


(1) Upper GI bleeding


Code(s): K92.2 - GASTROINTESTINAL HEMORRHAGE, UNSPECIFIED





(2) Hypertension


Code(s): I10 - ESSENTIAL (PRIMARY) HYPERTENSION   





(3) Hypothyroidism


Code(s): E03.9 - HYPOTHYROIDISM, UNSPECIFIED   





(4) Pyelonephritis


Code(s): N12 - TUBULO-INTERSTITIAL NEPHRITIS, NOT SPCF AS ACUTE OR CHRONIC





(5) Anxiety


Code(s): F41.9 - ANXIETY DISORDER, UNSPECIFIED

## 2017-09-21 VITALS — SYSTOLIC BLOOD PRESSURE: 113 MMHG | TEMPERATURE: 98.7 F | HEART RATE: 72 BPM | DIASTOLIC BLOOD PRESSURE: 78 MMHG

## 2017-09-21 RX ADMIN — LEVETIRACETAM SCH MG: 250 TABLET, FILM COATED ORAL at 09:38

## 2017-09-21 RX ADMIN — LIOTHYRONINE SODIUM SCH MCG: 5 TABLET ORAL at 09:39

## 2017-09-21 RX ADMIN — MORPHINE SULFATE PRN MG: 4 INJECTION, SOLUTION INTRAMUSCULAR; INTRAVENOUS at 05:34

## 2017-09-21 RX ADMIN — PANTOPRAZOLE SODIUM SCH MLS/HR: 40 INJECTION, POWDER, FOR SOLUTION INTRAVENOUS at 00:02

## 2017-09-21 RX ADMIN — LEVOTHYROXINE SODIUM SCH MCG: 125 TABLET ORAL at 06:07

## 2017-09-21 RX ADMIN — CEFTRIAXONE SODIUM SCH MLS/HR: 2 INJECTION, POWDER, FOR SOLUTION INTRAMUSCULAR; INTRAVENOUS at 09:37

## 2017-09-21 RX ADMIN — PANTOPRAZOLE SODIUM SCH MLS/HR: 40 INJECTION, POWDER, FOR SOLUTION INTRAVENOUS at 09:37

## 2017-09-21 RX ADMIN — POTASSIUM CHLORIDE AND SODIUM CHLORIDE SCH MLS/HR: 900; 150 INJECTION, SOLUTION INTRAVENOUS at 09:39

## 2017-09-21 RX ADMIN — MORPHINE SULFATE PRN MG: 4 INJECTION, SOLUTION INTRAMUSCULAR; INTRAVENOUS at 11:09

## 2017-09-21 RX ADMIN — ONDANSETRON PRN MG: 2 INJECTION INTRAMUSCULAR; INTRAVENOUS at 07:35

## 2017-09-21 RX ADMIN — ATENOLOL SCH MG: 50 TABLET ORAL at 09:38

## 2017-09-21 NOTE — DS
Physical Exam: 


SUBJECTIVE: Patient seen and examined








OBJECTIVE:





 Vital Signs











 Period  Temp  Pulse  Resp  BP Sys/Valle  Pulse Ox


 


 Last 24 Hr  98.1 F-99.2 F  68-75  18-20  108-124/69-85  96-97








PHYSICAL EXAM





GENERAL: The patient is awake, alert, and fully oriented, in no acute distress.


HEAD: Normal with no signs of trauma.


EYES: PERRL, extraocular movements intact, sclera anicteric, conjunctiva clear. 


ENT: Ears normal, nares patent, oropharynx clear without exudates, moist mucous 

membranes.


NECK: Trachea midline, full range of motion, supple. 


LUNGS: Breath sounds equal, clear to auscultation bilaterally, no wheezes, no 

crackles, no accessory muscle use. 


HEART: Regular rate and rhythm, S1, S2 without murmur, rub or gallop.


ABDOMEN: Soft, nontender, nondistended, normoactive bowel sounds, no guarding, 

no rebound, no hepatosplenomegaly, no masses.


EXTREMITIES: 2+ pulses, warm, well-perfused, no edema. 


NEUROLOGICAL: Cranial nerves II through XII grossly intact. Normal speech, gait 

not observed.


PSYCH: Normal mood, normal affect.


SKIN: Warm, dry, normal turgor, no rashes or lesions noted.





LABS


 Laboratory Results - last 24 hr











  09/20/17





  08:00


 


TSH  23.60 H











HOSPITAL COURSE:





Date of Admission:09/19/17





Date of Discharge: 09/21/17











Minutes to complete discharge: 45





Discharge Summary


Reason For Visit: ABD, LEFT FLANK PAIN


Current Active Problems





Anxiety (Acute) 


Hypertension (Acute) 


Hypothyroidism (Acute) 


Intractable vomiting with nausea (Acute) 


Pyelonephritis (Acute) 


Upper GI bleeding (Acute) 








Condition: Stable





- Home Medications


Comprehensive Discharge Medication List: 


Ambulatory Orders





Atenolol [Tenormin -] 50 mg PO BID 09/18/17 


Levetiracetam [Keppra -] 750 mg PO BID 09/18/17 


Levothyroxine Sodium [Synthroid] 275 mcg PO DAILY 09/18/17 


Liothyronine Sodium [Cytomel] 5 mcg PO DAILY 09/18/17 


Prazosin HCl [Minipress] 5 mg PO HS 09/18/17 


Quetiapine Fumarate [Seroquel] 300 mg PO HS 09/18/17 


Tizanidine HCl [Zanaflex] 4 mg PO ASDIR 09/18/17 


Zolpidem Tartrate [Ambien] 10 mg PO ASDIR PRN 09/18/17 











Problem List





- Problems


(1) Upper GI bleeding


Code(s): K92.2 - GASTROINTESTINAL HEMORRHAGE, UNSPECIFIED





(2) Hypertension


Code(s): I10 - ESSENTIAL (PRIMARY) HYPERTENSION   





(3) Hypothyroidism


Code(s): E03.9 - HYPOTHYROIDISM, UNSPECIFIED   





(4) Pyelonephritis


Code(s): N12 - TUBULO-INTERSTITIAL NEPHRITIS, NOT SPCF AS ACUTE OR CHRONIC





(5) Anxiety


Code(s): F41.9 - ANXIETY DISORDER, UNSPECIFIED

## 2018-01-18 ENCOUNTER — HOSPITAL ENCOUNTER (OUTPATIENT)
Dept: HOSPITAL 74 - FER | Age: 38
Setting detail: OBSERVATION
LOS: 2 days | Discharge: HOME | End: 2018-01-20
Attending: NURSE PRACTITIONER | Admitting: INTERNAL MEDICINE
Payer: COMMERCIAL

## 2018-01-18 VITALS — BODY MASS INDEX: 44.9 KG/M2

## 2018-01-18 DIAGNOSIS — Z87.442: ICD-10-CM

## 2018-01-18 DIAGNOSIS — E03.9: ICD-10-CM

## 2018-01-18 DIAGNOSIS — Z88.1: ICD-10-CM

## 2018-01-18 DIAGNOSIS — Z88.6: ICD-10-CM
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
## 2018-01-18 DIAGNOSIS — Z85.850: ICD-10-CM

## 2018-01-18 DIAGNOSIS — Z87.11: ICD-10-CM

## 2018-01-18 DIAGNOSIS — F41.9: ICD-10-CM

## 2018-01-18 DIAGNOSIS — R10.9: ICD-10-CM

## 2018-01-18 DIAGNOSIS — T18.9XXA: Primary | ICD-10-CM
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
## 2018-01-18 DIAGNOSIS — G40.909: ICD-10-CM

## 2018-01-18 DIAGNOSIS — I10: ICD-10-CM

## 2018-01-18 DIAGNOSIS — K22.6: ICD-10-CM

## 2018-01-18 DIAGNOSIS — K29.50: ICD-10-CM

## 2018-01-18 LAB
ALBUMIN SERPL-MCNC: 4.1 G/DL (ref 3.5–5)
ALP SERPL-CCNC: 85 U/L (ref 32–92)
ALT SERPL-CCNC: 28 U/L (ref 10–40)
ANION GAP SERPL CALC-SCNC: 11 MMOL/L (ref 8–16)
AST SERPL-CCNC: 25 U/L (ref 10–42)
BACTERIA #/AREA URNS HPF: (no result) /HPF
BASOPHILS # BLD: 3.2 % (ref 0–2)
BILIRUB SERPL-MCNC: 0.2 MG/DL (ref 0.2–1)
BUN SERPL-MCNC: 12 MG/DL (ref 7–18)
CALCIUM SERPL-MCNC: 8.9 MG/DL (ref 8.4–10.2)
CHLORIDE SERPL-SCNC: 108 MMOL/L (ref 98–107)
CO2 SERPL-SCNC: 20 MMOL/L (ref 22–28)
COLOR UR: YELLOW
CREAT SERPL-MCNC: 0.8 MG/DL (ref 0.6–1.3)
DEPRECATED RDW RBC AUTO: 15 % (ref 11.6–15.6)
EOSINOPHIL # BLD: 2.1 % (ref 0–4.5)
EPITH CASTS URNS QL MICRO: (no result) /HPF
GLUCOSE SERPL-MCNC: 82 MG/DL (ref 74–106)
HCG UR QL: NEGATIVE
HCT VFR BLD CALC: 40.7 % (ref 32.4–45.2)
HGB BLD-MCNC: 13.1 GM/DL (ref 10.7–15.3)
INR BLD: 1.05 (ref 0.82–1.09)
LYMPHOCYTES # BLD: 20.4 % (ref 8–40)
MCH RBC QN AUTO: 26 PG (ref 25.7–33.7)
MCHC RBC AUTO-ENTMCNC: 32.1 G/DL (ref 32–36)
MCV RBC: 81 FL (ref 80–96)
MONOCYTES # BLD AUTO: 5.2 % (ref 3.8–10.2)
NEUTROPHILS # BLD: 69.1 % (ref 42.8–82.8)
PH UR: 6.5 [PH] (ref 4.5–8)
PLATELET # BLD AUTO: 252 K/MM3 (ref 134–434)
PMV BLD: 9.2 FL (ref 7.5–11.1)
POTASSIUM SERPLBLD-SCNC: 4.1 MMOL/L (ref 3.5–5.1)
PROT SERPL-MCNC: 7.3 G/DL (ref 6.4–8.3)
PROT UR QL STRIP: (no result)
PT PNL PPP: 11.7 SEC (ref 10.2–13)
RBC # BLD AUTO: (no result) /HPF (ref 0–3)
RBC # BLD AUTO: 5.03 M/MM3 (ref 3.6–5.2)
SODIUM SERPL-SCNC: 139 MMOL/L (ref 136–145)
SP GR UR: 1.02 (ref 1–1.02)
UROBILINOGEN UR STRIP-MCNC: 0.2 MG/DL (ref 0.2–1)
WBC # BLD AUTO: 10.3 K/MM3 (ref 4–10.8)

## 2018-01-18 PROCEDURE — 0DJ08ZZ INSPECTION OF UPPER INTESTINAL TRACT, VIA NATURAL OR ARTIFICIAL OPENING ENDOSCOPIC: ICD-10-PCS | Performed by: INTERNAL MEDICINE

## 2018-01-18 PROCEDURE — 3E0337Z INTRODUCTION OF ELECTROLYTIC AND WATER BALANCE SUBSTANCE INTO PERIPHERAL VEIN, PERCUTANEOUS APPROACH: ICD-10-PCS | Performed by: NURSE PRACTITIONER

## 2018-01-18 PROCEDURE — G0378 HOSPITAL OBSERVATION PER HR: HCPCS

## 2018-01-18 PROCEDURE — 3E033GC INTRODUCTION OF OTHER THERAPEUTIC SUBSTANCE INTO PERIPHERAL VEIN, PERCUTANEOUS APPROACH: ICD-10-PCS | Performed by: NURSE PRACTITIONER

## 2018-01-18 PROCEDURE — 3E033NZ INTRODUCTION OF ANALGESICS, HYPNOTICS, SEDATIVES INTO PERIPHERAL VEIN, PERCUTANEOUS APPROACH: ICD-10-PCS | Performed by: NURSE PRACTITIONER

## 2018-01-18 RX ADMIN — ONDANSETRON PRN MG: 2 INJECTION INTRAMUSCULAR; INTRAVENOUS at 22:00

## 2018-01-18 RX ADMIN — MORPHINE SULFATE PRN MG: 10 INJECTION, SOLUTION INTRAMUSCULAR; INTRAVENOUS at 23:35

## 2018-01-18 RX ADMIN — LEVETIRACETAM SCH: 500 TABLET, FILM COATED ORAL at 23:32

## 2018-01-18 RX ADMIN — DEXTROSE AND SODIUM CHLORIDE SCH MLS: 5; 450 INJECTION, SOLUTION INTRAVENOUS at 22:38

## 2018-01-18 RX ADMIN — PRAZOSIN HYDROCHLORIDE SCH: 5 CAPSULE ORAL at 23:32

## 2018-01-18 NOTE — PDOC
History of Present Illness





- General


History Source: Patient


Exam Limitations: No Limitations





- History of Present Illness


Initial Comments: 





01/18/18 17:13


Patient is a 37 year old female with a significant past medical history of 

abdominal surgeries, seizures, peptic ulcers, anxiety, kidney stones who 

presents to the ED with complaints of abdominal pain s/p swallowing a dime-

sized battery for a remote controlled car. Patient states she was helping her 

family member open a remote-control car with a screwdriver. Patient put the 

battery in her mouth and her dog jumped on her and that is 


when she swallowed the battery around 10 am this morning. Patient began 

experiencing epigastric pain.  Patient states she took Maalox and Pepto Bismol 

with no relief. Patient states she began vomiting several times. When she 

arrived to the ER she vomited again, this time with blood in her vomit.  

Patient had a endoscopy done last September which revealed esophagitis with a 

small Veronica Lara tear.





Denies chest pain, SOB. Denies diarrhea, constipation. Denies dizziness, 

lightheadedness. Denies fever, chills. Denies any dysuria, hematochezia. Denies 

any other symptoms. 





Allergies: Torodal (seizures), Sulfa, Ketorolac tromethamine 


Social history: denies


Surgical history: Multiple abdominal hernia surgeries. Gallbladder removal. 

Appendix removal. Thyroid removal. Parathyroid removal. 


PMD: Dr. Aquilino Leo


GI: Dr. Tristan Acevedo








Severity: mild


Associated Symptoms: reports: nausea/vomiting





<Janki Hughes - Last Filed: 01/18/18 19:34>





- General


History Source: Patient


Exam Limitations: No Limitations





- History of Present Illness


Timing/Duration: 4-6 hours


Severity: mild


Associated Symptoms: reports: nausea/vomiting





<Pedro Schofield S - Last Filed: 01/20/18 19:41>





- General


Chief Complaint: Pain


Stated Complaint: PT ACCIDENTLY INJESTED  A SMALL BATTERY NOW HAS ABDOMINAL PAIN


Time Seen by Provider: 01/18/18 16:38





Past History





- Travel


Traveled outside of the country in the last 30 days: No


Close contact w/someone who was outside of country & ill: No





<Janki Hughes - Last Filed: 01/18/18 19:34>





- Past Medical History


Cancer: Yes


GI Disorders: Yes (Veronica-Lara Syndrome)


HTN: Yes


Kidney Stones: Yes


Psychiatric Problems: Yes (ANXIETY)


Seizures: Yes


Thyroid Disease: Yes





- Surgical History


Abdominal Surgery: Yes (HERNIA REPAIRS)





- Suicide/Smoking/Psychosocial Hx


Smoking History: Never smoked


Have you smoked in the past 12 months: No


Hx Alcohol Use: No


Drug/Substance Use Hx: No


Substance Use Type: None


Hx Substance Use Treatment: No





<Pedro Schofield - Last Filed: 01/20/18 19:41>





- Past Medical History


Allergies/Adverse Reactions: 


 Allergies











Allergy/AdvReac Type Severity Reaction Status Date / Time


 


ciprofloxacin [From Cipro] Allergy  Hives Verified 01/18/18 16:41


 


ciprofloxacin HCl Allergy   Verified 01/18/18 16:41





[From Cipro]     


 


ketorolac tromethamine Allergy   Verified 01/18/18 19:21





[From Toradol]     


 


tramadol Allergy   Verified 01/18/18 19:21











Home Medications: 


Ambulatory Orders





Levetiracetam [Keppra -] 750 mg PO BID 09/18/17 


Liothyronine Sodium [Cytomel] 5 mcg PO DAILY 09/18/17 


Prazosin HCl [Minipress] 5 mg PO HS 09/18/17 


Quetiapine Fumarate [Seroquel] 300 mg PO HS 09/18/17 


Levothyroxine [Synthroid -] 275 mcg PO DAILY 01/18/18 


Pantoprazole Sodium [Protonix -] 40 mg PO DAILY #30 tablet.ec 01/20/18 


Polymyxin B Sulf/Trimethoprim [Polymyxin B-Tmp Eye Drops] 10 ml OD Q3H #1 bot 01 /20/18 











**Review of Systems





- Review of Systems


Able to Perform ROS?: Yes


Is the patient limited English proficient: No


ABD/GI: Yes: Nausea, Vomiting (with blood)





<Janki Hughes - Last Filed: 01/18/18 19:34>





- Review of Systems


Constitutional: No: Symptoms Reported, See HPI, Chills, Diaphoresis, Fever, 

Loss of Appetite, Malaise, Night Sweats, Weakness, Weight Stable, Unintentional 

Wgt. Loss, Unexplained wgt Loss, Other


Respiratory: No: Symptoms reported, See HPI, Cough, Orthopnea, Shortness of 

Breath, SOB with Exertion, SOB at Rest, Stridor, Wheezing, Productive cough, 

Hemoptysis, Other


Cardiac (ROS): No: Symptoms Reported, See HPI, Chest Pain, Edema, Irregular 

Heart Rate, Lightheadedness, Palpitations, Syncope, Chest Tightness, Other


ABD/GI: Yes: See HPI, Nausea, Vomiting, Abdominal cramping


Musculoskeletal: No: Symptoms Reported, See HPI, Back Pain, Gout, Joint Pain, 

Joint Swelling, Muscle Pain, Muscle Weakness, Neck Pain, Joint Stiffness, Other


Integumentary: No: Symptoms Reported, See HPI, Bruising, Change in Color, 

Change in Hair/Nails, Dryness, Erythema, Flushing, Lesions, Lumps, Pallor, 

Pruritus, Rash, Sweating, Other


Neurological: No: Symptoms reported, See HPI, Headache, Numbness, Paresthesia, 

Pre-Existing Deficit, Seizure, Tingling, Tremors, Weakness, Unsteady Gait, 

Ataxia, Dizziness, Other


All Other Systems: Reviewed and Negative





<Pedro Schofield S - Last Filed: 01/20/18 19:41>





*Physical Exam





- Vital Signs


 Last Vital Signs











Temp Pulse Resp BP Pulse Ox


 


 98.8 F   124 H  20   142/109   98 


 


 01/18/18 16:32  01/18/18 16:32  01/18/18 16:32  01/18/18 16:32  01/18/18 16:32














- Physical Exam


General Appearance: Yes: Obese (morbidly obsese. )


Respiratory/Chest: positive: Lungs Clear


Cardiovascular: positive: Regular Rhythm, Regular Rate





<Janki Hughes - Last Filed: 01/18/18 19:34>





ED Treatment Course





- LABORATORY


CBC & Chemistry Diagram: 


 01/18/18 19:10





 01/18/18 17:50





- Consult/PCP


Case discussed with consulting physician: Mahesh Stark (Spoke with GI, patient 

will be transferred to [facility] to be scoped. )





<Janki Hughes - Last Filed: 01/18/18 19:34>





- LABORATORY


CBC & Chemistry Diagram: 


 01/20/18 06:10





 01/20/18 06:10





<Pedro Schofield - Last Filed: 01/20/18 19:41>





Medical Decision Making





- Medical Decision Making


Spoke with Dr Stark, who will be the accepting physician. Patient will be 

transferred to PACU then endoscopy at [facility]. 








<Janki Hughes - Last Filed: 01/18/18 19:34>





- Medical Decision Making


[facility]


01/18/18 19:02








<Pedro Schofield - Last Filed: 01/20/18 19:41>





*DC/Admit/Observation/Transfer





<Janki Hughes - Last Filed: 01/18/18 19:34>





<Pedro Schofield - Last Filed: 01/20/18 19:41>


Diagnosis at time of Disposition: 


Foreign body ingestion


Qualifiers:


 Encounter type: initial encounter Qualified Code(s): T18.9XXA - Foreign body 

of alimentary tract, part unspecified, initial encounter








- Discharge Dispostion


Condition at time of disposition: Improved

## 2018-01-18 NOTE — CON.GI
Consult


Consult Specialty:: GI: Dr. Valdivia covering for Dr. Stark


Referred by:: ER


Reason for Consultation:: Ingested batteries





- History of Present Illness


Chief Complaint: I swallowed batteries


History of Present Illness: 





37F seen initially at [facility] earlier today after accidentally 

swallowing 2 small disc batteries at around 11am.  She waited, took pepto 

bismol and mylanta prior to coming to the ER.  She began experiencing upper 

abdominal  discomfort with vomiting.  She was trasferred to [facility] for further 

evaluation.  She had EGD performed by Dr. Reina Madden 9/17 at [facility] 

that revealed distal esophagitis and a megan mariee tear.  Ms. Morgan believes 

that she was prescribed prevacid at the time.  She says that she "has always 

had stomach problems" but does not seem to have regular GI follow-up.  AXR 

performed 5:20pm revealed what appeared to be two disc batteries below the 

diaphragm in the stomach.  She denies dysphagia or chest pain. 





- History Source


History Provided By: Patient, Medical Record


Limitations to Obtaining History: No Limitations





- Past Medical History


CNS: Yes: Seizure (Last seizure 3 years ago)


...LMP: 09/11/17


Endocrine: Yes: Hypothyroidism (Surgical secondary to resection for cancer), 

Other (obesity)





- Past Surgical History


Past Surgical History: Yes: Appendectomy, Cholecystectomy (Lap)


Additional Surgical History: partial parathyroidectomy





- Alcohol/Substance Use


Hx Alcohol Use: No





- Smoking History


Smoking history: Never smoked


Have you smoked in the past 12 months: No





- Social History


ADL: Independent


Place of Birth: [facility]


History of Recent Travel: No





Home Medications





- Allergies


Allergies/Adverse Reactions: 


 Allergies











Allergy/AdvReac Type Severity Reaction Status Date / Time


 


ciprofloxacin [From Cipro] Allergy  Hives Verified 01/18/18 16:41


 


ciprofloxacin HCl Allergy   Verified 01/18/18 16:41





[From Cipro]     


 


ketorolac tromethamine Allergy   Verified 01/18/18 19:21





[From Toradol]     


 


tramadol Allergy   Verified 01/18/18 19:21














- Home Medications


Home Medications: 


Ambulatory Orders





Levetiracetam [Keppra -] 750 mg PO BID 09/18/17 


Liothyronine Sodium [Cytomel] 5 mcg PO DAILY 09/18/17 


Prazosin HCl [Minipress] 5 mg PO HS 09/18/17 


Quetiapine Fumarate [Seroquel] 300 mg PO HS 09/18/17 


Levothyroxine [Synthroid -] 275 mcg PO DAILY 01/18/18 











Family Disease History





- Family Disease History


Family Disease History: Other: Father (Alive: 74: Colon Cancer), Mother (Alive: 

71: Healthy)





Review of Systems





- Review of Systems


Constitutional: denies: Chills


Cardiovascular: denies: Chest Pain


Respiratory: denies: SOB


Gastrointestinal: reports: Abdominal Pain, Vomiting





Physical Exam-GI


Vital Signs: 


 Vital Signs











Temperature  98.8 F   01/18/18 16:32


 


Pulse Rate  92 H  01/18/18 18:44


 


Respiratory Rate  20   01/18/18 18:44


 


Blood Pressure  151/106   01/18/18 18:44


 


O2 Sat by Pulse Oximetry (%)  98   01/18/18 18:44











Constitutional: Yes: Calm


Eyes: No: Sclera Icterus


Cardiovascular: Yes: Tachycardia (regular rhythm)


Respiratory: Yes: Diminished (CTA b/l)


Gastrointestinal Inspection: Yes: Scars (multiple scars).  No: Distention


...Auscultate: Yes: Normoactive Bowel Sounds


...Palpate: Yes: Tenderness (epigastric TTP).  No: Hepatomegaly, Splenomegaly


...Percussion: No: Tympanitic


Edema: No (No LE edema)


Labs: 


 CBC, BMP





 01/18/18 19:10 





 01/18/18 19:10 





 INR, PTT











INR  1.05  (0.82-1.09)   01/18/18  19:10    














Problem List





- Problems


(1) Foreign body ingestion


Assessment/Plan: 


Ingestion of two disc batteries


Given her complaints of epigastric discomfort, will attemp endoscopic 

retrieval.  If the batteries have passed the stomach, will need observation and 

surgical evaluation.  If she remains asymptomatic without overt bleeding / 

abdominal pain, will need close follow-up at home, weekly AXR to assure passage 

of the batteries.  I explained this to Ms. Morgan  


I discussed the plan with Ms. Morgan.  We discussed potential risks of the 

procedure like but not limited to bleeding, perfortion requiring surgery to 

repair, infection and sedation medication effects all of which could be 

potentially life threatening.  She has agreed to the procedure.  Othe 

recommendations pending the above.











Code(s): T18.9XXA - FOREIGN BODY OF ALIMENTARY TRACT, PART UNSP, INIT ENCNTR   


Qualifiers: 


   Encounter type: initial encounter   Qualified Code(s): T18.9XXA - Foreign 

body of alimentary tract, part unspecified, initial encounter

## 2018-01-18 NOTE — HP
CHIEF COMPLAINT:


   vomiting with blood, epigastric pain 





PCP:





HISTORY OF PRESENT ILLNESS:


 37 year old female presents s/p accidental ingestion of 2 coin lithium 

batteries around 11:30 am . Two hrs later she developed nausea , epigastric 

abdominal pain and had 2-3 episodes of vomiting with blood tinged mucus. 


Now c/o epigastric abdominal pain 6/10 constant 








Recent Travel:


no 





PAST MEDICAL HISTORY:


parathyroid CA


HTN


Gastritis 


Seizure disorder


Hypothyroidism  


Gastritis





PAST SURGICAL HISTORY:


Appendectomy 


Thyroidectomy 


Hernia repair 


Cholecystectomy 


EGD  9/19, small megan tear and gastritis noted





Social History:


Smoking:


Alcohol:


Drugs: 





Family History:





Colon Ca - father 





Allergies





ciprofloxacin [From Cipro] Allergy (Verified 01/18/18 16:41)


 Hives


ciprofloxacin HCl [From Cipro] Allergy (Verified 01/18/18 16:41)


 


ketorolac tromethamine [From Toradol] Allergy (Verified 01/18/18 19:21)


 


 AS PER PT  THIS MEDICATION TRIGGERS HER SEIZURES 


tramadol Allergy (Verified 01/18/18 19:21)


 


 AS PER PT  THIS MEDICATION TRIGGERS HER SEIZURES 








HOME MEDICATIONS:


 Home Medications











 Medication  Instructions  Recorded


 


Levetiracetam [Keppra -] 750 mg PO BID 09/18/17


 


Liothyronine Sodium [Cytomel] 5 mcg PO DAILY 09/18/17


 


Prazosin HCl [Minipress] 5 mg PO HS 09/18/17


 


Quetiapine Fumarate [Seroquel] 300 mg PO HS 09/18/17


 


Levothyroxine [Synthroid -] 275 mcg PO DAILY 01/18/18








REVIEW OF SYSTEMS


CONSTITUTIONAL: 


Absent:  fever, chills, diaphoresis, generalized weakness, malaise, loss of 

appetite, weight change


HEENT: 


Absent:  rhinorrhea, nasal congestion, throat pain, throat swelling, difficulty 

swallowing, mouth swelling, ear pain, eye pain, visual changes


CARDIOVASCULAR: 


Absent: chest pain, syncope, palpitations, irregular heart rate, lightheadedness

, peripheral edema


RESPIRATORY: 


Absent: cough, shortness of breath, dyspnea with exertion, orthopnea, wheezing, 

stridor, hemoptysis


GASTROINTESTINAL:


see HPI


GENITOURINARY: 


Absent: dysuria, frequency, urgency, hesitancy, hematuria, flank pain, genital 

pain


MUSCULOSKELETAL: 


Absent: myalgia, arthralgia, joint swelling, back pain, neck pain


SKIN: 


Absent: rash, itching, pallor


HEMATOLOGIC/IMMUNOLOGIC: 


Absent: easy bleeding, easy bruising, lymphadenopathy, frequent infections


ENDOCRINE:


Absent: unexplained weight gain, unexplained weight loss, heat intolerance, 

cold intolerance


NEUROLOGIC: 


Absent: headache, focal weakness or paresthesias, dizziness, unsteady gait, 

seizure, mental status changes, bladder or bowel incontinence


PSYCHIATRIC: 


Absent: anxiety, depression, suicidal or homicidal ideation, hallucinations.








PHYSICAL EXAMINATION


 Vital Signs - 24 hr











  01/18/18 01/18/18





  16:32 18:44


 


Temperature 98.8 F 


 


Pulse Rate 124 H 


 


Pulse Rate [  92 H





Left Radial]  


 


Respiratory 20 20





Rate  


 


Blood Pressure 142/109 


 


Blood Pressure  151/106





[Right Arm]  


 


O2 Sat by Pulse 98 98





Oximetry (%)  











GENERAL: Awake, alert, and fully oriented, in no acute distress.


HEAD: Normal with no signs of trauma.


EYES: Pupils equal, round and reactive to light, extraocular movements intact, 

sclera anicteric, conjunctiva clear. No lid lag.


EARS, NOSE, THROAT: Ears normal, nares patent, oropharynx clear without 

exudates. Moist mucous membranes.


NECK: Normal range of motion, supple without lymphadenopathy, JVD, or masses.


LUNGS: Breath sounds equal, clear to auscultation bilaterally. No wheezes, and 

no crackles. No accessory muscle use.


HEART: Regular rate and rhythm, normal S1 and S2 without murmur, rub or gallop.


ABDOMEN: Soft, positive epigastric tenderness 


MUSCULOSKELETAL: Normal range of motion at all joints. No bony deformities or 

tenderness. No CVA tenderness.


UPPER EXTREMITIES: 2+ pulses, warm, well-perfused. No cyanosis. No clubbing. No 

peripheral edema.


LOWER EXTREMITIES: 2+ pulses, warm, well-perfused. No calf tenderness. No 

peripheral edema. 


NEUROLOGICAL:  Cranial nerves II-XII intact. Normal speech. Normal gait.


PSYCHIATRIC: Cooperative. Good eye contact. Appropriate mood and affect.


SKIN: Warm, dry, normal turgor, no rashes or lesions noted, normal capillary 

refill. 


 Laboratory Results - last 24 hr











  01/18/18 01/18/18 01/18/18





  17:27 17:50 17:50


 


WBC   Cancelled 


 


Corrected WBC (auto)   Cancelled 


 


RBC   Cancelled 


 


Hgb   Cancelled 


 


Hct   Cancelled 


 


MCV   Cancelled 


 


MCH   Cancelled 


 


MCHC   Cancelled 


 


RDW   Cancelled 


 


Plt Count   Cancelled 


 


MPV   Cancelled 


 


Absolute Neuts (auto)   Cancelled 


 


Absolute Lymphs (auto)   Cancelled 


 


Absolute Monos (auto)   Cancelled 


 


Absolute Eos (auto)   Cancelled 


 


Absolute Basos (auto)   Cancelled 


 


Add Manual Diff   Cancelled 


 


Neutrophils %   Cancelled 


 


Lymphocytes %   Cancelled 


 


Monocytes %   Cancelled 


 


Eosinophils %   Cancelled 


 


Basophils %   Cancelled 


 


Nucleated RBC %   Cancelled 


 


Platelet Estimate   Cancelled 


 


Platelet Comment   Cancelled 


 


Normal RBC Morphology   Cancelled 


 


PT with INR   


 


INR   


 


Sodium    Cancelled


 


Potassium    Cancelled


 


Chloride    Cancelled


 


Carbon Dioxide    Cancelled


 


Anion Gap    Cancelled


 


BUN    Cancelled


 


Creatinine    Cancelled


 


Creat Clearance w eGFR    Cancelled


 


Random Glucose    Cancelled


 


Calcium    Cancelled


 


Total Bilirubin    Cancelled


 


AST    Cancelled


 


ALT    Cancelled


 


Alkaline Phosphatase    Cancelled


 


Total Protein    Cancelled


 


Albumin    Cancelled


 


Urine Color  Yellow  


 


Urine Appearance  Clear  


 


Urine pH  6.5  


 


Ur Specific Gravity  1.025  


 


Urine Protein  1+ H  


 


Urine Glucose (UA)  Negative  


 


Urine Ketones  Negative  


 


Urine Blood  Trace-intact H  


 


Urine Nitrite  Negative  


 


Urine Bilirubin  Negative  


 


Urine Urobilinogen  0.2  


 


Ur Leukocyte Esterase  Negative  


 


Urine RBC  5-10  


 


Urine WBC  10-20  


 


Ur Epithelial Cells  Few  


 


Urine Bacteria  Moderate  


 


Urine HCG, Qual  Negative  














  01/18/18 01/18/18 01/18/18





  19:10 19:10 19:10


 


WBC  10.3  D  


 


Corrected WBC (auto)   


 


RBC  5.03  


 


Hgb  13.1  


 


Hct  40.7  


 


MCV  81.0  


 


MCH  26.0  


 


MCHC  32.1  


 


RDW  15.0  


 


Plt Count  252  


 


MPV  9.2  


 


Absolute Neuts (auto)   


 


Absolute Lymphs (auto)   


 


Absolute Monos (auto)   


 


Absolute Eos (auto)   


 


Absolute Basos (auto)   


 


Add Manual Diff   


 


Neutrophils %  69.1  


 


Lymphocytes %  20.4  


 


Monocytes %  5.2  


 


Eosinophils %  2.1  


 


Basophils %  3.2 H  


 


Nucleated RBC %   


 


Platelet Estimate   


 


Platelet Comment   


 


Normal RBC Morphology   


 


PT with INR   11.7 


 


INR   1.05 


 


Sodium    139


 


Potassium    4.1


 


Chloride    108 H


 


Carbon Dioxide    20 L


 


Anion Gap    11


 


BUN    12  D


 


Creatinine    0.8


 


Creat Clearance w eGFR    > 60


 


Random Glucose    82


 


Calcium    8.9


 


Total Bilirubin    0.2


 


AST    25


 


ALT    28  D


 


Alkaline Phosphatase    85


 


Total Protein    7.3


 


Albumin    4.1


 


Urine Color   


 


Urine Appearance   


 


Urine pH   


 


Ur Specific Gravity   


 


Urine Protein   


 


Urine Glucose (UA)   


 


Urine Ketones   


 


Urine Blood   


 


Urine Nitrite   


 


Urine Bilirubin   


 


Urine Urobilinogen   


 


Ur Leukocyte Esterase   


 


Urine RBC   


 


Urine WBC   


 


Ur Epithelial Cells   


 


Urine Bacteria   


 


Urine HCG, Qual   











ASSESSMENT/PLAN:





1. Foreign Body Ingestion , accidental 


2. Acute gastritis 


3. Uncontrolled HTN - not on medications 


4. History of hypothyroidism - 


5.  History of seizures 











- NPO 


- IVF


- PPI IV


- endoscopy today 


- if not able to retrieve foreign body will order serial abd XR 


- TSH 


- Lopressor PRN 








OBSERVATION 








 














Visit type





- Emergency Visit


Emergency Visit: No





- New Patient


This patient is new to me today: Yes


Date on this admission: 01/18/18





- Critical Care


Critical Care patient: No

## 2018-01-19 LAB
ALBUMIN SERPL-MCNC: 3.4 G/DL (ref 3.4–5)
ALP SERPL-CCNC: 91 U/L (ref 45–117)
ALT SERPL-CCNC: 31 U/L (ref 12–78)
ANION GAP SERPL CALC-SCNC: 12 MMOL/L (ref 8–16)
AST SERPL-CCNC: 21 U/L (ref 15–37)
BILIRUB SERPL-MCNC: 0.5 MG/DL (ref 0.2–1)
BUN SERPL-MCNC: 11 MG/DL (ref 7–18)
CALCIUM SERPL-MCNC: 7.5 MG/DL (ref 8.5–10.1)
CHLORIDE SERPL-SCNC: 109 MMOL/L (ref 98–107)
CO2 SERPL-SCNC: 20 MMOL/L (ref 21–32)
CREAT SERPL-MCNC: 0.9 MG/DL (ref 0.55–1.02)
DEPRECATED RDW RBC AUTO: 16.1 % (ref 11.6–15.6)
GLUCOSE SERPL-MCNC: 82 MG/DL (ref 74–106)
HCT VFR BLD CALC: 39.9 % (ref 32.4–45.2)
HGB BLD-MCNC: 12.5 GM/DL (ref 10.7–15.3)
MCH RBC QN AUTO: 25.1 PG (ref 25.7–33.7)
MCHC RBC AUTO-ENTMCNC: 31.4 G/DL (ref 32–36)
MCV RBC: 80 FL (ref 80–96)
PLATELET # BLD AUTO: 282 K/MM3 (ref 134–434)
PMV BLD: 8.7 FL (ref 7.5–11.1)
POTASSIUM SERPLBLD-SCNC: 3.8 MMOL/L (ref 3.5–5.1)
PROT SERPL-MCNC: 6.4 G/DL (ref 6.4–8.2)
RBC # BLD AUTO: 4.99 M/MM3 (ref 3.6–5.2)
SODIUM SERPL-SCNC: 141 MMOL/L (ref 136–145)
WBC # BLD AUTO: 11.3 K/MM3 (ref 4–10)

## 2018-01-19 RX ADMIN — DEXTROSE AND SODIUM CHLORIDE SCH: 5; 450 INJECTION, SOLUTION INTRAVENOUS at 21:56

## 2018-01-19 RX ADMIN — PRAZOSIN HYDROCHLORIDE SCH: 5 CAPSULE ORAL at 21:24

## 2018-01-19 RX ADMIN — POLYETHYLENE GLYCOL 3350 SCH GM: 17 POWDER, FOR SOLUTION ORAL at 14:20

## 2018-01-19 RX ADMIN — ONDANSETRON PRN MG: 2 INJECTION INTRAMUSCULAR; INTRAVENOUS at 03:35

## 2018-01-19 RX ADMIN — PANTOPRAZOLE SODIUM SCH MG: 40 TABLET, DELAYED RELEASE ORAL at 14:13

## 2018-01-19 RX ADMIN — MORPHINE SULFATE PRN MG: 10 INJECTION, SOLUTION INTRAMUSCULAR; INTRAVENOUS at 08:00

## 2018-01-19 RX ADMIN — LEVETIRACETAM SCH: 500 TABLET, FILM COATED ORAL at 21:24

## 2018-01-19 RX ADMIN — LEVETIRACETAM SCH MG: 500 TABLET, FILM COATED ORAL at 14:12

## 2018-01-19 NOTE — PN
Progress Note (short form)





- Note


Progress Note: 





Subjective: The patient was seen and examined at the bedside, she has reports 

of vomiting with blood tinged vomitus 


X-ray this AM with batteries past stomach


 Current Medications











Generic Name Dose Route Start Last Admin





  Trade Name Freq  PRN Reason Stop Dose Admin


 


Acetaminophen  1,000 mg  01/19/18 12:50  





  Ofirmev Injection -  IVPB  01/19/18 12:51  





  ONCE ONE   


 


Sodium Chloride  1,000 mls @ 83 mls/hr  01/18/18 21:00  01/18/18 23:38





  Normal Saline -  IV   Not Given





  ASDIR LISSET   


 


Dextrose/Sodium Chloride  1,000 mls @ 100 mls/hr  01/18/18 21:45  01/18/18 22:38





  D5-1/2ns -  IV   0 mls





  ASDIR LISSET   Administration


 


Levetiracetam 500 mg/  750 mg  01/18/18 22:00  01/18/18 23:32





  Levetiracetam 250 mg  PO   Not Given





  BID LISSET   


 


Levothyroxine Sodium  275 mcg  01/19/18 10:00  





  Synthroid -  PO   





  DAILY LISSET   


 


Metoprolol Tartrate  5 mg  01/18/18 21:01  





  Lopressor Injection -  IVPB   





  Q4H PRN   





  HYPERTENSION   


 


Morphine Sulfate  1 mg  01/18/18 21:03  01/19/18 08:00





  Morphine Injection -  IVPUSH   1 mg





  Q4H PRN   Administration





  PAIN LEVEL 4 - 6   


 


Oxycodone HCl  10 mg  01/18/18 21:43  





  Roxicodone -  PO  01/19/18 21:42  





  Q4H PRN   





  PAIN LEVEL > 4   


 


Pantoprazole Sodium  40 mg  01/19/18 12:30  





  Protonix -  PO   





  DAILY [facility]   


 


Polyethylene Glycol  17 gm  01/19/18 12:45  





  Miralax (For Daily Use) -  PO   





  DAILY LISSET   


 


Prazosin HCl  5 mg  01/18/18 22:00  01/18/18 23:32





  Minipress -  PO   Not Given





  HS LISSET   


 


Quetiapine Fumarate  300 mg  01/19/18 22:00  





  Seroquel -  PO   





  HS LISSET   








Objective: 


 Vital Signs











 Period  Temp  Pulse  Resp  BP Sys/Valle  Pulse Ox


 


 Last 24 Hr  98.2 F-98.9 F    16-20  112-155/  








Physical Exam:


General: NAD, A&Ox3


Lungs: CTA bilaterally


Heart: RRR, S1S2


Abd: Soft, non-tender, non-distended. Normoactive bowel sounds


Ext: Warm, well-perfused. 2+ DP/PT bilaterally


Neuro: CN 2-12 intact





 CBCD











WBC  11.3 K/mm3 (4.0-10.0)  H  01/19/18  08:15    


 


RBC  4.99 M/mm3 (3.60-5.2)   01/19/18  08:15    


 


Hgb  12.5 GM/dL (10.7-15.3)   01/19/18  08:15    


 


Hct  39.9 % (32.4-45.2)   01/19/18  08:15    


 


MCV  80.0 fl (80-96)   01/19/18  08:15    


 


MCHC  31.4 g/dl (32.0-36.0)  L  01/19/18  08:15    


 


RDW  16.1 % (11.6-15.6)  H  01/19/18  08:15    


 


Plt Count  282 K/MM3 (134-434)   01/19/18  08:15    


 


MPV  8.7 fl (7.5-11.1)   01/19/18  08:15    








 CMP











Sodium  141 mmol/L (136-145)   01/19/18  06:30    


 


Potassium  3.8 mmol/L (3.5-5.1)   01/19/18  06:30    


 


Chloride  109 mmol/L ()  H  01/19/18  06:30    


 


Carbon Dioxide  20 mmol/L (21-32)  L  01/19/18  06:30    


 


Anion Gap  12  (8-16)   01/19/18  06:30    


 


BUN  11 mg/dL (7-18)   01/19/18  06:30    


 


Creatinine  0.9 mg/dL (0.55-1.02)   01/19/18  06:30    


 


Creat Clearance w eGFR  > 60  (>60)   01/19/18  06:30    


 


Random Glucose  82 mg/dL ()   01/19/18  06:30    


 


Calcium  7.5 mg/dL (8.5-10.1)  L  01/19/18  06:30    


 


Total Bilirubin  0.5 mg/dL (0.2-1.0)   01/19/18  06:30    


 


AST  21 U/L (15-37)   01/19/18  06:30    


 


ALT  31 U/L (12-78)   01/19/18  06:30    


 


Alkaline Phosphatase  91 U/L ()   01/19/18  06:30    


 


Total Protein  6.4 g/dl (6.4-8.2)   01/19/18  06:30    


 


Albumin  3.4 g/dl (3.4-5.0)   01/19/18  06:30    








Assessment: This is a 37 year old female with PMHx of parathyroid cancer, HTN, 

gastritis, seizure disorder, hypothyroidism, gastritis, appendectomy, 

thyroidectomy, cholecytectomy, who presented to the ED s/p ingestion of 2 coin 

lithium batteries. 





Plan: 


1) Foreign body ingestion


- Batteries appear to be past the stomach


- Discussed with RN and patient to monitor stool 


- EGD performed last night with no obvious source of bleeding


- Protonix


- Clear liquid diet


- Appreciate GI consult


- F/u surgical consult





2) Abdominal pain


- Continue to monitor


- F/u FUA in AM





3) Hypothyroidism


- TSH 9.93 (was 23.6 on 9/20/17)


- F/u T4


- Continue synthroid at current dose for now





4) Seizure disorder


- Continue Keppra





5) Anxiety





6) F/E/N:


- Clear liquid diet


- Monitor electrolytes





7) Prophylaxis: 


- OOB ambulating


- SCDs bilaterally


- Hold chemical DVT prophylaxis as the patient is reporting blood tinged vomitus





8) Dispo:


- Once condition improves





CODE STATUS: FULL CODE





Visit type





- Emergency Visit


Emergency Visit: Yes


ED Registration Date: 01/18/18


Care time: The patient presented to the Emergency Department on the above date 

and was hospitalized for further evaluation of their emergent condition.





- New Patient


This patient is new to me today: Yes


Date on this admission: 01/19/18





- Critical Care


Critical Care patient: No

## 2018-01-19 NOTE — EKG
Test Reason : 

Blood Pressure : ***/*** mmHG

Vent. Rate : 085 BPM     Atrial Rate : 085 BPM

   P-R Int : 154 ms          QRS Dur : 082 ms

    QT Int : 366 ms       P-R-T Axes : 030 018 007 degrees

   QTc Int : 435 ms

 

NORMAL SINUS RHYTHM

NONSPECIFIC T WAVE ABNORMALITY

ABNORMAL ECG

Confirmed by MD STEFANIA, PEYTON ([id number]) on 1/19/2018 3:14:01 PM

 

Referred By:             Confirmed By:PEYTON AGUIAR MD

## 2018-01-19 NOTE — CONSULT
- Consultation


REQUESTING PROVIDER: Shea





CONSULT REQUEST: We have been asked to surgically evaluate this patient for 

abdominal pain





PCP:Tabitha Norman





HISTORY OF PRESENT ILLNESS: EJ who is a 38 y/o A/A female who accidently 

swallowed 2 button batteries; she was admitted to [facility] and transferred here for 

EGD which ws done yesterday; she c/o diffuse vague abdominal pain ? present 

prior to this episode; she has h/o # abdominal surgeries; hopsital course to 

date reveals the batteries are progressing in the GI tract; she denies nausea 

and or vomiting per se but " spitting up " blood; she has no GI//GYN c/o o/w.








PMHx:    thyroid; hypertension; seizure disorder      





PSHx:   open RIH repair; open appendectomy; lap saurabh ? incisional hernia 

repair w/mesh; thyroidectomy and hyperparathyroidectomy     


 Home Medications











 Medication  Instructions  Recorded


 


Levetiracetam [Keppra -] 750 mg PO BID 09/18/17


 


Liothyronine Sodium [Cytomel] 5 mcg PO DAILY 09/18/17


 


Prazosin HCl [Minipress] 5 mg PO HS 09/18/17


 


Quetiapine Fumarate [Seroquel] 300 mg PO HS 09/18/17


 


Levothyroxine [Synthroid -] 275 mcg PO DAILY 01/18/18








 Allergies











Allergy/AdvReac Type Severity Reaction Status Date / Time


 


ciprofloxacin [From Cipro] Allergy  Hives Verified 01/18/18 16:41


 


ciprofloxacin HCl Allergy   Verified 01/18/18 16:41





[From Cipro]     


 


ketorolac tromethamine Allergy   Verified 01/18/18 19:21





[From Toradol]     


 


tramadol Allergy   Verified 01/18/18 19:21











PHYSICAL EXAM:


GENERAL: Awake, alert, and fully oriented, in no acute distress.


HEAD: Normal with no signs of trauma.


EYES: sclera anicteric, conjunctiva clear.


NECK: Normal ROM, supple without lymphadenopathy, JVD, or masses.


ABDOMEN: Soft, nontender, not distended, normoactive bowel sounds, no guarding, 

no rebound, no masses.  No organomegaly. # healed surgical scars; no hernias


MUSCULOSKELETAL: Normal ROM at all joints. No bony deformities or tenderness. 

No CVA tenderness.


UPPER EXTREMITIES: 2+ pulses, warm, well-perfused. No cyanosis. Cap refill <2 

seconds. No peripheral edema.


LOWER EXTREMITIES: 2+ pulses, warm, well-perfused. No calf tenderness. No 

peripheral edema. 


NEUROLOGICAL: Normal speech, gait not observed.


PSYCH: Cooperative. Good eye contact. Appropriate mood and affect.


SKIN: Warm, dry, normal turgor, no rashes or lesions noted.


 Vital Signs











Temperature  98.5 F   01/19/18 06:00


 


Pulse Rate  76   01/19/18 06:00


 


Respiratory Rate  20   01/19/18 06:00


 


Blood Pressure  112/76   01/19/18 06:00


 


O2 Sat by Pulse Oximetry (%)  97   01/19/18 05:00








 Lab Results











WBC  11.3 K/mm3 (4.0-10.0)  H  01/19/18  08:15    


 


RBC  4.99 M/mm3 (3.60-5.2)   01/19/18  08:15    


 


Hgb  12.5 GM/dL (10.7-15.3)   01/19/18  08:15    


 


Hct  39.9 % (32.4-45.2)   01/19/18  08:15    


 


MCV  80.0 fl (80-96)   01/19/18  08:15    


 


MCHC  31.4 g/dl (32.0-36.0)  L  01/19/18  08:15    


 


RDW  16.1 % (11.6-15.6)  H  01/19/18  08:15    


 


Plt Count  282 K/MM3 (134-434)   01/19/18  08:15    


 


Sodium  141 mmol/L (136-145)   01/19/18  06:30    


 


Potassium  3.8 mmol/L (3.5-5.1)   01/19/18  06:30    


 


Chloride  109 mmol/L ()  H  01/19/18  06:30    


 


Carbon Dioxide  20 mmol/L (21-32)  L  01/19/18  06:30    


 


Anion Gap  12  (8-16)   01/19/18  06:30    


 


BUN  11 mg/dL (7-18)   01/19/18  06:30    


 


Creatinine  0.9 mg/dL (0.55-1.02)   01/19/18  06:30    


 


Random Glucose  82 mg/dL ()   01/19/18  06:30    


 


Calcium  7.5 mg/dL (8.5-10.1)  L  01/19/18  06:30    


 


Blood Type  O POSITIVE   01/18/18  19:10    


 


Antibody Screen  Negative   01/18/18  19:10    


 


INR  1.05  (0.82-1.09)   01/18/18  19:10    








Imaging w/u to date reviewed; previous admission reviewed





IMP: foreign body ingestion and abdominal pain of ? origin and unrelated to the 

current problem.





PLAN: No evidence of an acute surgical abdomen; no obstruction; continue 

present tx until no evidence of FB seen on abdominal xrays.





Sher De Leon MD FACS





Visit type





- Case Type


Case Type: ED Admission





- Emergency


Emergency Visit: Yes


ED Registration Date: 01/18/18


Care time: The patient presented to the Emergency Department on the above date 

and was hospitalized for further evaluation of their emergent condition.





- New patient


This patient is new to me today: Yes


Date on this admission: 01/19/18





- Critical Care


Critical Care patient: No

## 2018-01-19 NOTE — PN
GI Progress Note


Subjective: 





Complains that she still has some abdominal discomfort, more in lower abdomen 

now.  Also reported that she spit up pink tinge last night. There was no 

obvious bleeding source noted on EGD last night however the was a good amount 

of retained food noted in the body of the stomach.  Repeat AXR in AM revealed 

that the batteries passed the stomach. No melena / rectal bleeding reported.  





- Objective


Vital Signs: 


 Vital Signs











Temperature  98.5 F   01/19/18 06:00


 


Pulse Rate  76   01/19/18 06:00


 


Respiratory Rate  20   01/19/18 06:00


 


Blood Pressure  112/76   01/19/18 06:00


 


O2 Sat by Pulse Oximetry (%)  97   01/19/18 05:00











Constitutional: Calm


Eyes: No: Sclera Icterus


Cardiovascular: Yes: Regular Rate and Rhythm


Respiratory: Yes: CTA Bilaterally


Gastrointestinal Inspection: Yes: Scars.  No: Distention


...Auscultate: Yes: Normoactive Bowel Sounds


...Palpate: Yes: Tenderness (TTP mid/lower abdomen)


...Percussion: No: Tympanitic


Neurological: Yes: Alert, Oriented


Labs: 


 CBC, BMP





 01/19/18 08:15 





 01/19/18 06:30 





 INR, PTT











INR  1.05  (0.82-1.09)   01/18/18  19:10    














Problem List





- Problems


(1) Foreign body ingestion


Assessment/Plan: 


Batteries have passed the stomach


Added protonix 40mg once daily


Clear liquids 


Will continue to observe as inpatient given abdominal pain complaints.  D/W 

surgeon Dr. De Leon who will see Ms. Cathy KOLB in AM








Code(s): T18.9XXA - FOREIGN BODY OF ALIMENTARY TRACT, PART UNSP, INIT ENCNTR   


Qualifiers: 


   Encounter type: initial encounter   Qualified Code(s): T18.9XXA - Foreign 

body of alimentary tract, part unspecified, initial encounter

## 2018-01-20 VITALS — DIASTOLIC BLOOD PRESSURE: 73 MMHG | HEART RATE: 71 BPM | SYSTOLIC BLOOD PRESSURE: 116 MMHG | TEMPERATURE: 98.5 F

## 2018-01-20 LAB
ANION GAP SERPL CALC-SCNC: 8 MMOL/L (ref 8–16)
BASOPHILS # BLD: 0.9 % (ref 0–2)
BUN SERPL-MCNC: 7 MG/DL (ref 7–18)
CALCIUM SERPL-MCNC: 7.6 MG/DL (ref 8.5–10.1)
CHLORIDE SERPL-SCNC: 105 MMOL/L (ref 98–107)
CO2 SERPL-SCNC: 25 MMOL/L (ref 21–32)
CREAT SERPL-MCNC: 0.9 MG/DL (ref 0.55–1.02)
DEPRECATED RDW RBC AUTO: 15.8 % (ref 11.6–15.6)
EOSINOPHIL # BLD: 8.2 % (ref 0–4.5)
GLUCOSE SERPL-MCNC: 95 MG/DL (ref 74–106)
HCT VFR BLD CALC: 37.4 % (ref 32.4–45.2)
HGB BLD-MCNC: 11.8 GM/DL (ref 10.7–15.3)
LYMPHOCYTES # BLD: 36 % (ref 8–40)
MCH RBC QN AUTO: 25.5 PG (ref 25.7–33.7)
MCHC RBC AUTO-ENTMCNC: 31.6 G/DL (ref 32–36)
MCV RBC: 80.7 FL (ref 80–96)
MONOCYTES # BLD AUTO: 6.1 % (ref 3.8–10.2)
NEUTROPHILS # BLD: 48.8 % (ref 42.8–82.8)
PLATELET # BLD AUTO: 251 K/MM3 (ref 134–434)
PMV BLD: 8.7 FL (ref 7.5–11.1)
POTASSIUM SERPLBLD-SCNC: 3.9 MMOL/L (ref 3.5–5.1)
RBC # BLD AUTO: 4.63 M/MM3 (ref 3.6–5.2)
SODIUM SERPL-SCNC: 138 MMOL/L (ref 136–145)
WBC # BLD AUTO: 7.3 K/MM3 (ref 4–10)

## 2018-01-20 RX ADMIN — LEVETIRACETAM SCH MG: 500 TABLET, FILM COATED ORAL at 11:34

## 2018-01-20 RX ADMIN — POLYETHYLENE GLYCOL 3350 SCH GM: 17 POWDER, FOR SOLUTION ORAL at 11:34

## 2018-01-20 RX ADMIN — DEXTROSE AND SODIUM CHLORIDE SCH MLS/HR: 5; 450 INJECTION, SOLUTION INTRAVENOUS at 03:00

## 2018-01-20 RX ADMIN — MORPHINE SULFATE PRN MG: 10 INJECTION, SOLUTION INTRAMUSCULAR; INTRAVENOUS at 16:11

## 2018-01-20 RX ADMIN — MORPHINE SULFATE PRN MG: 10 INJECTION, SOLUTION INTRAMUSCULAR; INTRAVENOUS at 04:17

## 2018-01-20 RX ADMIN — DEXTROSE AND SODIUM CHLORIDE SCH MLS/HR: 5; 450 INJECTION, SOLUTION INTRAVENOUS at 12:51

## 2018-01-20 RX ADMIN — MORPHINE SULFATE PRN MG: 10 INJECTION, SOLUTION INTRAMUSCULAR; INTRAVENOUS at 09:10

## 2018-01-20 RX ADMIN — PANTOPRAZOLE SODIUM SCH MG: 40 TABLET, DELAYED RELEASE ORAL at 09:10

## 2018-01-20 NOTE — PN
Progress Note (short form)





- Note


Progress Note: 





Subjective: The patient was seen and examined at the bedside, she reports 

feeling much better today


Right eye swelling, no erythema, no tenderness, no evidence of conjunctivitis. 

Likely 2/2 to patient rubbing her eye, possible eyelash retained inside eye. 

Warm compresses


For FUA now





 Current Medications











Generic Name Dose Route Start Last Admin





  Trade Name Freq  PRN Reason Stop Dose Admin


 


Dextrose/Sodium Chloride  1,000 mls @ 100 mls/hr  01/18/18 21:45  01/20/18 03:00





  D5-1/2ns -  IV   100 mls/hr





  ASDIR LISSET   Administration


 


Levetiracetam 500 mg/  750 mg  01/18/18 22:00  01/19/18 21:24





  Levetiracetam 250 mg  PO   Not Given





  BID LISSET   


 


Levothyroxine Sodium  275 mcg  01/19/18 10:00  





  Synthroid -  PO   





  DAILY LISSET   


 


Metoprolol Tartrate  5 mg  01/18/18 21:01  





  Lopressor Injection -  IVPB   





  Q4H PRN   





  HYPERTENSION   


 


Morphine Sulfate  1 mg  01/18/18 21:03  01/20/18 04:17





  Morphine Injection -  IVPUSH   1 mg





  Q4H PRN   Administration





  PAIN LEVEL 4 - 6   


 


Pantoprazole Sodium  40 mg  01/19/18 12:30  01/19/18 14:13





  Protonix -  PO   40 mg





  DAILY LISSET   Administration


 


Polyethylene Glycol  17 gm  01/19/18 12:45  01/19/18 14:20





  Miralax (For Daily Use) -  PO   17 gm





  DAILY LISSET   Administration


 


Prazosin HCl  5 mg  01/18/18 22:00  01/19/18 21:24





  Minipress -  PO   Not Given





  HS LISSET   


 


Quetiapine Fumarate  300 mg  01/19/18 22:00  01/19/18 21:24





  Seroquel -  PO   Not Given





  HS LISSET   








Objective: 


 


 Vital Signs











 Period  Temp  Pulse  Resp  BP Sys/Valle  Pulse Ox


 


 Last 24 Hr  98.1 F-98.7 F  72-84  18-20  106-145/57-92  96-96








Physical Exam:


General: NAD, A&Ox3


Lungs: CTA bilaterally


Heart: RRR, S1S2


Abd: Soft, non-tender, non-distended. Normoactive bowel sounds


Ext: Warm, well-perfused. 2+ DP/PT bilaterally


Neuro: CN 2-12 intact





 CBCD











WBC  7.3 K/mm3 (4.0-10.0)  D 01/20/18  06:10    


 


RBC  4.63 M/mm3 (3.60-5.2)   01/20/18  06:10    


 


Hgb  11.8 GM/dL (10.7-15.3)   01/20/18  06:10    


 


Hct  37.4 % (32.4-45.2)   01/20/18  06:10    


 


MCV  80.7 fl (80-96)   01/20/18  06:10    


 


MCHC  31.6 g/dl (32.0-36.0)  L  01/20/18  06:10    


 


RDW  15.8 % (11.6-15.6)  H  01/20/18  06:10    


 


Plt Count  251 K/MM3 (134-434)   01/20/18  06:10    


 


MPV  8.7 fl (7.5-11.1)   01/20/18  06:10    








 CMP











Sodium  141 mmol/L (136-145)   01/19/18  06:30    


 


Potassium  3.8 mmol/L (3.5-5.1)   01/19/18  06:30    


 


Chloride  109 mmol/L ()  H  01/19/18  06:30    


 


Carbon Dioxide  20 mmol/L (21-32)  L  01/19/18  06:30    


 


Anion Gap  12  (8-16)   01/19/18  06:30    


 


BUN  11 mg/dL (7-18)   01/19/18  06:30    


 


Creatinine  0.9 mg/dL (0.55-1.02)   01/19/18  06:30    


 


Creat Clearance w eGFR  > 60  (>60)   01/19/18  06:30    


 


Random Glucose  82 mg/dL ()   01/19/18  06:30    


 


Calcium  7.5 mg/dL (8.5-10.1)  L  01/19/18  06:30    


 


Total Bilirubin  0.5 mg/dL (0.2-1.0)   01/19/18  06:30    


 


AST  21 U/L (15-37)   01/19/18  06:30    


 


ALT  31 U/L (12-78)   01/19/18  06:30    


 


Alkaline Phosphatase  91 U/L ()   01/19/18  06:30    


 


Total Protein  6.4 g/dl (6.4-8.2)   01/19/18  06:30    


 


Albumin  3.4 g/dl (3.4-5.0)   01/19/18  06:30    











Assessment: This is a 37 year old female with PMHx of parathyroid cancer, HTN, 

gastritis, seizure disorder, hypothyroidism, gastritis, appendectomy, 

thyroidectomy, cholecytectomy, who presented to the ED s/p ingestion of 2 coin 

lithium batteries. 





Plan: 


1) Foreign body ingestion


- Batteries appear to be past the stomach on X-ray yesterday


- F/u FUA today


- Patient has not had a bowel movement


- EGD performed 1/18 with no obvious source of bleeding


- Protonix


- Clear liquid diet


- Appreciate GI consult


- Apreicate surgical consult





2) Abdominal pain


- Resolved





3) Hypothyroidism


- TSH 9.93 (was 23.6 on 9/20/17)


- F/u T4


- Continue synthroid at current dose for now (dose not given yesterday per 

pharmacy)





4) Seizure disorder


- Continue Keppra (patient refused last night)





5) Anxiety





6) F/E/N:


- Clear liquid diet


- Monitor electrolytes





7) Prophylaxis: 


- OOB ambulating


- SCDs bilaterally





8) Dispo:


- Once condition improves





CODE STATUS: FULL CODE





Visit type





- Emergency Visit


Emergency Visit: Yes


ED Registration Date: 01/18/18


Care time: The patient presented to the Emergency Department on the above date 

and was hospitalized for further evaluation of their emergent condition.





- New Patient


This patient is new to me today: No





- Critical Care


Critical Care patient: No

## 2018-01-20 NOTE — PN
Progress Note (short form)





- Note


Progress Note: 





GI NOTE FOR DR BHATT





PT APPEARS EXTREMELY COMFORTABLE


NO GI COMPLAINTS


WANTS TO EAT SOLID FOODS AND GO HOME


NO N/V/F/C/S


NO BM TODAY





ABD SOFT AND NT


VSS


OBESE





FUA NOTES ; FB IS TRAVELING DISTALLY





GI WISE AS THE FOREIGN BODY APPEARS TO BE PROGRESSING DISTALLY AND PT HAS NO GI 

SYMPTOMS, SHE CAN EAT AND F/U AS OUTPATIENT


SHE SHOULD HAVE ANOTHER FUA IN 2-3 WEEKS TO ASSURE PASSAGE





THANKS,  MD NIMISHA

## 2018-01-20 NOTE — DS
Physical Examination


Vital Signs: 


 Vital Signs











Temperature  98.7 F   01/20/18 13:00


 


Pulse Rate  77   01/20/18 13:00


 


Respiratory Rate  18   01/20/18 13:00


 


Blood Pressure  125/86   01/20/18 13:00


 


O2 Sat by Pulse Oximetry (%)  96   01/20/18 05:00











Labs: 


 CBC, BMP





 01/20/18 06:10 





 01/20/18 06:10 











Discharge Summary


Reason For Visit: PT ACCIDENTLY INJESTED  A SMALL BATTERY NO HAS ABD


Current Active Problems





Foreign body ingestion (Acute)








Condition: Improved





- Instructions


Diet, Activity, Other Instructions: 


Please return to the ED with new, persistent, or worsening symptoms. Please 

follow-up with providers as indicated. 





Please have an abdominal x-ray done in 2-3 weeks with your primary care 

provider. 


Referrals: 


Luis Armando Valdivia DO [Staff Physician] -  (Please follow-up with GI within 1 

week. )


Disposition: HOME





- Home Medications


Comprehensive Discharge Medication List: 


Ambulatory Orders





Levetiracetam [Keppra -] 750 mg PO BID 09/18/17 


Liothyronine Sodium [Cytomel] 5 mcg PO DAILY 09/18/17 


Prazosin HCl [Minipress] 5 mg PO HS 09/18/17 


Quetiapine Fumarate [Seroquel] 300 mg PO HS 09/18/17 


Levothyroxine [Synthroid -] 275 mcg PO DAILY 01/18/18 


Pantoprazole Sodium [Protonix -] 40 mg PO DAILY #30 tablet.ec 01/20/18